# Patient Record
Sex: FEMALE | Race: BLACK OR AFRICAN AMERICAN | NOT HISPANIC OR LATINO | URBAN - METROPOLITAN AREA
[De-identification: names, ages, dates, MRNs, and addresses within clinical notes are randomized per-mention and may not be internally consistent; named-entity substitution may affect disease eponyms.]

---

## 2019-03-25 ENCOUNTER — EMERGENCY (EMERGENCY)
Facility: HOSPITAL | Age: 29
LOS: 1 days | Discharge: HOME | End: 2019-03-25
Attending: EMERGENCY MEDICINE
Payer: MEDICAID

## 2019-03-25 VITALS
DIASTOLIC BLOOD PRESSURE: 72 MMHG | RESPIRATION RATE: 18 BRPM | HEIGHT: 60 IN | TEMPERATURE: 98 F | OXYGEN SATURATION: 100 % | WEIGHT: 169.98 LBS | HEART RATE: 95 BPM | SYSTOLIC BLOOD PRESSURE: 117 MMHG

## 2019-03-25 VITALS
HEART RATE: 72 BPM | DIASTOLIC BLOOD PRESSURE: 74 MMHG | OXYGEN SATURATION: 100 % | TEMPERATURE: 99 F | SYSTOLIC BLOOD PRESSURE: 110 MMHG | RESPIRATION RATE: 18 BRPM

## 2019-03-25 PROCEDURE — 99282 EMERGENCY DEPT VISIT SF MDM: CPT

## 2019-03-25 NOTE — ED PROVIDER NOTE - SKIN, MLM
2 small 1cm non-tender superficial areas of swelling on pt. R breast below nipple, c/w dermatitis. No LAD.

## 2019-03-25 NOTE — ED PROVIDER NOTE - ATTENDING CONTRIBUTION TO CARE
28yo F denies PMHx p/w CC breast lump under r breast, indurated, mild pain, small, non floucence at gland sites, likely mild early abscess, no skin changes, warm compresses, breast ca follow up, doesn't require abx at this time.

## 2019-03-25 NOTE — ED ADULT NURSE NOTE - NSIMPLEMENTINTERV_GEN_ALL_ED
Implemented All Universal Safety Interventions:  [place] to call system. Call bell, personal items and telephone within reach. Instruct patient to call for assistance. Room bathroom lighting operational. Non-slip footwear when patient is off stretcher. Physically safe environment: no spills, clutter or unnecessary equipment. Stretcher in lowest position, wheels locked, appropriate side rails in place.

## 2019-03-25 NOTE — ED PROVIDER NOTE - OBJECTIVE STATEMENT
28yo F denies PMHx p/w CC breast lump. Pt. states she has noticed occasional bumps on her armpits/areas that she shaves for the past few months, over past couple of days she had noticed 2 small painless lumps on her R breast. She states lumps are not painful and do not itch. Denies fever chills cp sob n/v/d.

## 2019-03-25 NOTE — ED ADULT NURSE NOTE - CHPI ED NUR SYMPTOMS NEG
no fever/no dizziness/no weakness/no decreased eating/drinking/no chills/no pain/no tingling/no nausea/no vomiting

## 2019-03-25 NOTE — ED PROVIDER NOTE - NSFOLLOWUPINSTRUCTIONS_ED_ALL_ED_FT
1. Follow up with General Surgery/Breast Clinic  2. Use warm compresses  3. Take tylenol or motrin for any pain or discomfort  4. Return to ED if symptoms worsen or if rash extends or you develop fever chills weakness etc.

## 2019-03-25 NOTE — ED PROVIDER NOTE - NSFOLLOWUPCLINICS_GEN_ALL_ED_FT
[facility]  General Surgery  [address] [place] [address] - 3rd [place] [address]  Phone: [phone]  Fax:   Follow Up Time:

## 2019-03-25 NOTE — ED PROVIDER NOTE - CLINICAL SUMMARY MEDICAL DECISION MAKING FREE TEXT BOX
28yo F denies PMhx p/w CC breast lump for 2 days c/w dermatitis, will reassure and give derm follow up.

## 2019-03-25 NOTE — ED ADULT NURSE NOTE - OBJECTIVE STATEMENT
29 year old A&Ox3 female presents ambulatory to ED complaining of 3 lumps on right breast x 2 weeks. Patient states that her son is sick and she has " been neglecting my own health ever since." Confirms having similar "abscesses" like this in the past, located on arms and groin but not on breast. Denies any drainage, pain, fevers, chills, unintentional weight loss. 2 quarter sized lumps located underneath right nipple, skin intact, slight discoloration around hair follicle, denies pain upon palpation. Denies CP, SOB, n/v/d, abdominal pain, urinary symptoms, HA, blurry vision, numbness, tingling in upper and lower extremities. VSS patient updated on plan of care.

## 2019-04-01 PROBLEM — Z00.00 ENCOUNTER FOR PREVENTIVE HEALTH EXAMINATION: Status: ACTIVE | Noted: 2019-04-01

## 2019-04-22 ENCOUNTER — APPOINTMENT (OUTPATIENT)
Dept: OBGYN | Facility: HOSPITAL | Age: 29
End: 2019-04-22

## 2019-07-08 ENCOUNTER — EMERGENCY (EMERGENCY)
Facility: HOSPITAL | Age: 29
LOS: 1 days | Discharge: HOME | End: 2019-07-08
Attending: EMERGENCY MEDICINE
Payer: MEDICAID

## 2019-07-08 VITALS
RESPIRATION RATE: 16 BRPM | OXYGEN SATURATION: 98 % | SYSTOLIC BLOOD PRESSURE: 121 MMHG | HEART RATE: 70 BPM | DIASTOLIC BLOOD PRESSURE: 81 MMHG

## 2019-07-08 VITALS
DIASTOLIC BLOOD PRESSURE: 82 MMHG | WEIGHT: 169.98 LBS | SYSTOLIC BLOOD PRESSURE: 126 MMHG | OXYGEN SATURATION: 98 % | HEART RATE: 74 BPM | HEIGHT: 60 IN | RESPIRATION RATE: 18 BRPM | TEMPERATURE: 98 F

## 2019-07-08 PROCEDURE — 99283 EMERGENCY DEPT VISIT LOW MDM: CPT

## 2019-07-08 PROCEDURE — 73630 X-RAY EXAM OF FOOT: CPT | Mod: 26,RT

## 2019-07-08 PROCEDURE — 73630 X-RAY EXAM OF FOOT: CPT

## 2019-07-08 RX ORDER — ACETAMINOPHEN 500 MG
650 TABLET ORAL ONCE
Refills: 0 | Status: COMPLETED | OUTPATIENT
Start: 2019-07-08 | End: 2019-07-08

## 2019-07-08 RX ORDER — IBUPROFEN 200 MG
600 TABLET ORAL ONCE
Refills: 0 | Status: COMPLETED | OUTPATIENT
Start: 2019-07-08 | End: 2019-07-08

## 2019-07-08 RX ADMIN — Medication 600 MILLIGRAM(S): at 11:49

## 2019-07-08 RX ADMIN — Medication 650 MILLIGRAM(S): at 11:50

## 2019-07-08 NOTE — ED PROVIDER NOTE - NS ED ROS FT
GENERAL: No fever or chills, EYES: no change in vision, HEENT: no trouble speaking, CARDIAC: no chest pain, palpitation PULMONARY: no cough or SOB,   SKIN: no rashes, NEURO: no headache,  MSK: + foot pain and swelling  ~Nancie Quispe MD (PGY 1)

## 2019-07-08 NOTE — ED PROVIDER NOTE - OBJECTIVE STATEMENT
Nancie Quispe MD: 30 yo F no PMH p/w right foot swelling and pain started yesterday. Pt states that she always had swelling Nancie Quispe MD: 28 yo F no PMH p/w right foot swelling and pain started yesterday. Pt states that the pain is on the dorsal and rivers medial side of the foot. Pt states that she always had some swelling in that foot after injury to the foot 15 ys ago which showed no fx. However, pt state that her foot has never swelled this much and never had pain associated with it. Aggravated by weight bearing and temporarily relieve with ice. Pt denies any recent injury or prolong standing. Pt also denies any hx of blood clots, fever, weakness, numbness.

## 2019-07-08 NOTE — ED PROVIDER NOTE - NSFOLLOWUPINSTRUCTIONS_ED_ALL_ED_FT
1) Please follow-up with your primary care doctor in the next 2-3 days.  Please call tomorrow for an appointment.  If you cannot follow-up with your primary care doctor please return to the ED for any urgent issues. Follow up with a Orthopedic doctor in 2-3 days  2) You were given a copy of the tests performed today.  Please bring the results with you and review them with your primary care doctor.  3) If you have any worsening of symptoms or any other concerns please return to the ED immediately. Such as but not limited to fever, increase pain, weakness and numbness  4) Please continue taking your home medications as directed. Apply ice 20 minutes on then 20 minutes off as much as you can tolerate in the next 24 hours.  Take motrin 600 mg every 6 hours for the next 2 days then every 6 hours as needed Keep your leg elevated as much as possible.

## 2019-07-08 NOTE — ED PROVIDER NOTE - NSFOLLOWUPCLINICS_GEN_ALL_ED_FT
[facility]  Orthopedic Surgery  [address], 3rd & 4th [place] [address]  Phone: [phone]  Fax:   Follow Up Time:

## 2019-07-08 NOTE — ED PROVIDER NOTE - ATTENDING CONTRIBUTION TO CARE
28 yo F no PMH p/w right foot swelling and pain which began yesterday. Pt indicates pain is on the dorsal aspects of the medial right foot. Pt states that she always had some swelling in that foot after injury to the foot 15 ys ago, which showed no fx.  However, pt state that her foot has never swelled this much and never had pain associated with it. Aggravated by weight bearing and temporarily relieve with ice. Pt denies any recent injury or prolong standing. Pt also denies any hx of blood clots, fever, weakness, numbness.  No recent travel, no calf pain or swelling, no redness, no fever.    On Physical Exam:  General: well appearing, in NAD, speaking clearly in full sentences and without difficulty; cooperative with exam  HEENT: airway patent  Skin: intact, no rash  Extremities: mild nonpitting dorsal mid right foot edema, tenderness along this aspect (diffuse along mid foot and ankle, no deformity, able to fully range foot and ankle; dp and pt pulses present; no RLE calf tenderness; no calf or pre tibial swelling/edema; right foot has no overlying rash or erythema and no palpable fluctuance or crepitance.  Neuro: no gross neurologic deficits; distal sensation in RLE intact    AP: chronic swelling, likely secondary to old muscular /ligamentous injury; will check x-ray, give NSAIDs, and reassess; if no acute injuries on xray / no fractures, as has been weight bearing all ready, will be stable for continued evaluation/management as outpatient.  Swelling is limited to dorsum of foot, no significant ankle or proximal swelling, and has no apparent risk factors for DVT, no w/u for DVT indicated.  No evidence elicited from history or noted on exam to suggest acute infectious etiology.

## 2019-07-08 NOTE — ED ADULT NURSE NOTE - OBJECTIVE STATEMENT
pt 28 yo female with nontraumatic swelling tenderness to right foot pt states no known injury swelling right great toe area with mild warmth pt afebrile skin warm dry no med hx pt is smoker hx c sections pt ucg neg exam by md with meds given and xray posted  and pending

## 2019-07-08 NOTE — ED ADULT NURSE NOTE - ED STAT RN HANDOFF DETAILS
Pt received from SABIHA Jo in blue, alert and oriented times three, breathing spontaneous, unlabored without distress on room air, NAD, awaits x-ray results

## 2019-07-08 NOTE — ED PROVIDER NOTE - CLINICAL SUMMARY MEDICAL DECISION MAKING FREE TEXT BOX
Nancie Quispe MD: 28 yo F no PMH p/w right foot swelling and pain started yesterday. Could bone spur from old injury vs gout, will get xray of foot to r/o fx or spur and pain control with tylenol or motrin reassess

## 2020-09-12 NOTE — ED ADULT NURSE NOTE - RESPIRATORY WDL
normal Breathing spontaneous and unlabored. Breath sounds clear and equal bilaterally with regular rhythm.

## 2020-11-13 ENCOUNTER — APPOINTMENT (OUTPATIENT)
Dept: FAMILY MEDICINE | Facility: CLINIC | Age: 30
End: 2020-11-13

## 2021-06-08 ENCOUNTER — EMERGENCY (EMERGENCY)
Facility: HOSPITAL | Age: 31
LOS: 1 days | Discharge: HOME | End: 2021-06-08
Attending: STUDENT IN AN ORGANIZED HEALTH CARE EDUCATION/TRAINING PROGRAM
Payer: MEDICAID

## 2021-06-08 VITALS
TEMPERATURE: 100 F | OXYGEN SATURATION: 98 % | DIASTOLIC BLOOD PRESSURE: 63 MMHG | SYSTOLIC BLOOD PRESSURE: 116 MMHG | RESPIRATION RATE: 20 BRPM

## 2021-06-08 VITALS
TEMPERATURE: 103 F | SYSTOLIC BLOOD PRESSURE: 116 MMHG | WEIGHT: 169.98 LBS | HEIGHT: 60 IN | OXYGEN SATURATION: 99 % | HEART RATE: 104 BPM | RESPIRATION RATE: 16 BRPM | DIASTOLIC BLOOD PRESSURE: 75 MMHG

## 2021-06-08 LAB
ALBUMIN SERPL ELPH-MCNC: 4.3 G/DL — SIGNIFICANT CHANGE UP (ref 3.3–5)
ALP SERPL-CCNC: 54 U/L — SIGNIFICANT CHANGE UP (ref 40–120)
ALT FLD-CCNC: 20 U/L — SIGNIFICANT CHANGE UP (ref 10–45)
ANION GAP SERPL CALC-SCNC: 13 MMOL/L — SIGNIFICANT CHANGE UP (ref 5–17)
APPEARANCE UR: CLEAR — SIGNIFICANT CHANGE UP
AST SERPL-CCNC: 15 U/L — SIGNIFICANT CHANGE UP (ref 10–40)
BACTERIA # UR AUTO: NEGATIVE — SIGNIFICANT CHANGE UP
BASE EXCESS BLDV CALC-SCNC: 1.2 MMOL/L — SIGNIFICANT CHANGE UP (ref -2–2)
BASOPHILS # BLD AUTO: 0 K/UL — SIGNIFICANT CHANGE UP (ref 0–0.2)
BASOPHILS NFR BLD AUTO: 0 % — SIGNIFICANT CHANGE UP (ref 0–2)
BILIRUB SERPL-MCNC: 0.7 MG/DL — SIGNIFICANT CHANGE UP (ref 0.2–1.2)
BILIRUB UR-MCNC: NEGATIVE — SIGNIFICANT CHANGE UP
BUN SERPL-MCNC: 5 MG/DL — LOW (ref 7–23)
CALCIUM SERPL-MCNC: 9.3 MG/DL — SIGNIFICANT CHANGE UP (ref 8.4–10.5)
CHLORIDE SERPL-SCNC: 99 MMOL/L — SIGNIFICANT CHANGE UP (ref 96–108)
CO2 BLDV-SCNC: 26 MMOL/L — SIGNIFICANT CHANGE UP (ref 22–30)
CO2 SERPL-SCNC: 21 MMOL/L — LOW (ref 22–31)
COLOR SPEC: YELLOW — SIGNIFICANT CHANGE UP
CREAT SERPL-MCNC: 0.75 MG/DL — SIGNIFICANT CHANGE UP (ref 0.5–1.3)
CRP SERPL-MCNC: 153 MG/L — HIGH (ref 0–4)
DIFF PNL FLD: ABNORMAL
EOSINOPHIL # BLD AUTO: 0 K/UL — SIGNIFICANT CHANGE UP (ref 0–0.5)
EOSINOPHIL NFR BLD AUTO: 0 % — SIGNIFICANT CHANGE UP (ref 0–6)
EPI CELLS # UR: 1 /HPF — SIGNIFICANT CHANGE UP
GAS PNL BLDV: SIGNIFICANT CHANGE UP
GLUCOSE SERPL-MCNC: 92 MG/DL — SIGNIFICANT CHANGE UP (ref 70–99)
GLUCOSE UR QL: NEGATIVE — SIGNIFICANT CHANGE UP
HCO3 BLDV-SCNC: 25 MMOL/L — SIGNIFICANT CHANGE UP (ref 21–29)
HCT VFR BLD CALC: 39.4 % — SIGNIFICANT CHANGE UP (ref 34.5–45)
HGB BLD-MCNC: 13 G/DL — SIGNIFICANT CHANGE UP (ref 11.5–15.5)
HYALINE CASTS # UR AUTO: 1 /LPF — SIGNIFICANT CHANGE UP (ref 0–2)
KETONES UR-MCNC: NEGATIVE — SIGNIFICANT CHANGE UP
LEUKOCYTE ESTERASE UR-ACNC: NEGATIVE — SIGNIFICANT CHANGE UP
LG PLATELETS BLD QL AUTO: SLIGHT — SIGNIFICANT CHANGE UP
LYMPHOCYTES # BLD AUTO: 16 % — SIGNIFICANT CHANGE UP (ref 13–44)
LYMPHOCYTES # BLD AUTO: 2.17 K/UL — SIGNIFICANT CHANGE UP (ref 1–3.3)
MANUAL SMEAR VERIFICATION: SIGNIFICANT CHANGE UP
MCHC RBC-ENTMCNC: 28.3 PG — SIGNIFICANT CHANGE UP (ref 27–34)
MCHC RBC-ENTMCNC: 33 GM/DL — SIGNIFICANT CHANGE UP (ref 32–36)
MCV RBC AUTO: 85.7 FL — SIGNIFICANT CHANGE UP (ref 80–100)
MONOCYTES # BLD AUTO: 1.22 K/UL — HIGH (ref 0–0.9)
MONOCYTES NFR BLD AUTO: 9 % — SIGNIFICANT CHANGE UP (ref 2–14)
NEUTROPHILS # BLD AUTO: 10.16 K/UL — HIGH (ref 1.8–7.4)
NEUTROPHILS NFR BLD AUTO: 75 % — SIGNIFICANT CHANGE UP (ref 43–77)
NITRITE UR-MCNC: NEGATIVE — SIGNIFICANT CHANGE UP
NRBC # BLD: 0 /100 — SIGNIFICANT CHANGE UP (ref 0–0)
PCO2 BLDV: 38 MMHG — SIGNIFICANT CHANGE UP (ref 35–50)
PH BLDV: 7.43 — SIGNIFICANT CHANGE UP (ref 7.35–7.45)
PH UR: 6 — SIGNIFICANT CHANGE UP (ref 5–8)
PLAT MORPH BLD: NORMAL — SIGNIFICANT CHANGE UP
PLATELET # BLD AUTO: 217 K/UL — SIGNIFICANT CHANGE UP (ref 150–400)
PO2 BLDV: 31 MMHG — SIGNIFICANT CHANGE UP (ref 25–45)
POTASSIUM SERPL-MCNC: 3.7 MMOL/L — SIGNIFICANT CHANGE UP (ref 3.5–5.3)
POTASSIUM SERPL-SCNC: 3.7 MMOL/L — SIGNIFICANT CHANGE UP (ref 3.5–5.3)
PROCALCITONIN SERPL-MCNC: 0.05 NG/ML — SIGNIFICANT CHANGE UP (ref 0.02–0.1)
PROT SERPL-MCNC: 8 G/DL — SIGNIFICANT CHANGE UP (ref 6–8.3)
PROT UR-MCNC: NEGATIVE — SIGNIFICANT CHANGE UP
RBC # BLD: 4.6 M/UL — SIGNIFICANT CHANGE UP (ref 3.8–5.2)
RBC # FLD: 13.3 % — SIGNIFICANT CHANGE UP (ref 10.3–14.5)
RBC BLD AUTO: SIGNIFICANT CHANGE UP
RBC CASTS # UR COMP ASSIST: 1 /HPF — SIGNIFICANT CHANGE UP (ref 0–4)
SAO2 % BLDV: 64 % — LOW (ref 67–88)
SARS-COV-2 RNA SPEC QL NAA+PROBE: SIGNIFICANT CHANGE UP
SODIUM SERPL-SCNC: 133 MMOL/L — LOW (ref 135–145)
SP GR SPEC: 1.01 — SIGNIFICANT CHANGE UP (ref 1.01–1.02)
UROBILINOGEN FLD QL: NEGATIVE — SIGNIFICANT CHANGE UP
WBC # BLD: 13.54 K/UL — HIGH (ref 3.8–10.5)
WBC # FLD AUTO: 13.54 K/UL — HIGH (ref 3.8–10.5)
WBC UR QL: 1 /HPF — SIGNIFICANT CHANGE UP (ref 0–5)

## 2021-06-08 PROCEDURE — 71045 X-RAY EXAM CHEST 1 VIEW: CPT | Mod: 26

## 2021-06-08 PROCEDURE — 93010 ELECTROCARDIOGRAM REPORT: CPT

## 2021-06-08 PROCEDURE — 82803 BLOOD GASES ANY COMBINATION: CPT

## 2021-06-08 PROCEDURE — 81001 URINALYSIS AUTO W/SCOPE: CPT

## 2021-06-08 PROCEDURE — 99285 EMERGENCY DEPT VISIT HI MDM: CPT | Mod: 25

## 2021-06-08 PROCEDURE — 93005 ELECTROCARDIOGRAM TRACING: CPT

## 2021-06-08 PROCEDURE — U0005: CPT

## 2021-06-08 PROCEDURE — 82728 ASSAY OF FERRITIN: CPT

## 2021-06-08 PROCEDURE — 71045 X-RAY EXAM CHEST 1 VIEW: CPT

## 2021-06-08 PROCEDURE — 86140 C-REACTIVE PROTEIN: CPT

## 2021-06-08 PROCEDURE — 99285 EMERGENCY DEPT VISIT HI MDM: CPT

## 2021-06-08 PROCEDURE — 87086 URINE CULTURE/COLONY COUNT: CPT

## 2021-06-08 PROCEDURE — 85025 COMPLETE CBC W/AUTO DIFF WBC: CPT

## 2021-06-08 PROCEDURE — U0003: CPT

## 2021-06-08 PROCEDURE — 84145 PROCALCITONIN (PCT): CPT

## 2021-06-08 PROCEDURE — 80053 COMPREHEN METABOLIC PANEL: CPT

## 2021-06-08 RX ORDER — CIPROFLOXACIN LACTATE 400MG/40ML
1 VIAL (ML) INTRAVENOUS
Qty: 9 | Refills: 0
Start: 2021-06-08 | End: 2021-06-16

## 2021-06-08 RX ORDER — ACETAMINOPHEN 500 MG
975 TABLET ORAL ONCE
Refills: 0 | Status: COMPLETED | OUTPATIENT
Start: 2021-06-08 | End: 2021-06-08

## 2021-06-08 RX ORDER — SODIUM CHLORIDE 9 MG/ML
1000 INJECTION INTRAMUSCULAR; INTRAVENOUS; SUBCUTANEOUS ONCE
Refills: 0 | Status: COMPLETED | OUTPATIENT
Start: 2021-06-08 | End: 2021-06-08

## 2021-06-08 RX ORDER — IBUPROFEN 200 MG
600 TABLET ORAL ONCE
Refills: 0 | Status: COMPLETED | OUTPATIENT
Start: 2021-06-08 | End: 2021-06-08

## 2021-06-08 RX ADMIN — SODIUM CHLORIDE 1000 MILLILITER(S): 9 INJECTION INTRAMUSCULAR; INTRAVENOUS; SUBCUTANEOUS at 12:56

## 2021-06-08 RX ADMIN — Medication 600 MILLIGRAM(S): at 14:42

## 2021-06-08 RX ADMIN — Medication 975 MILLIGRAM(S): at 12:55

## 2021-06-08 NOTE — ED PROVIDER NOTE - SEVERE SEPSIS ALERT DETAILS
I have seen and evaluated this patient and do not believe the patient is septic at this time.  I will continue to evaluate. -Vineet Mccarthy PA-C

## 2021-06-08 NOTE — ED ADULT NURSE NOTE - CHPI ED NUR SYMPTOMS NEG
no abdominal pain/no decreased eating/drinking/no diarrhea/no headache/no rash/no shortness of breath/no vomiting

## 2021-06-08 NOTE — ED PROVIDER NOTE - NS ED ROS FT
Review of Systems:  · Constitutional: fatigue, chills, fever, night sweats, no weight loss  · Nose: no epistaxis  · Mouth/Throat: no difficulty in swallowing, trachea midline, uvula midline  . Cardio: No CP, no palpitations, no chest pressure, no ripping chest pain  · Respiratory: no cough, no exertional dyspnea, no hemoptysis, no orthopnea, no shortness of breath  · Gastrointestinal: no abdominal pain, no diarrhea, no melena, no nausea, no vomiting  · Genitourinary: no difficulty urinating, no dysuria, no hematuria  · MUSCULOSKELETAL: FROM of all extremities  · Skin: no abrasion; no bruising; no laceration  · Neurological: no change in level of consciousness, no headache, no seizures  · ROS STATEMENT: all other ROS negative except as per HPI

## 2021-06-08 NOTE — ED PROVIDER NOTE - PATIENT PORTAL LINK FT
You can access the FollowMyHealth Patient Portal offered by [facility] by registering at the following website: [email or web address]. By joining [employer]’s FollowMyHealth portal, you will also be able to view your health information using other applications (apps) compatible with our system.

## 2021-06-08 NOTE — ED PROVIDER NOTE - PROGRESS NOTE DETAILS
Not concerned of sepsis att. more likely bacterial pneumonia. tachycardia 2/2 to dehydration. plan to abx and dc. discussed with attending aggred Vineet Mccarthy PA-C Jerod PARDO: pt comfortable w plan for dc, instructed to follow up w pmd for crp, strict return precautions discussed. no hypoxia.

## 2021-06-08 NOTE — ED PROVIDER NOTE - NSFOLLOWUPINSTRUCTIONS_ED_ALL_ED_FT
There are no signs of emergency conditions requiring admission to the hospital on today's workup.  Based on the evaluation, a presumptive diagnosis was made, however, further evaluation may be required by your primary care physician or a specialist for a more definitive diagnosis.  Therefore, please follow-up as directed or return to the Emergency Department if your symptoms change or worsen.    We recommend that you:  1. See your primary care physician within the next 72 hours for follow up.  Bring a copy of your discharge paperwork (including any test results) to your doctor.  2. Please take levofloxacin 750 mg 1 tablet once a day for 10 days.   3. Please take 500mg of Tylenol every 6-8 hours as needed for fevers.   4. Please stay hydrated and maintain healthy diet.       *** Return immediately if you have worsening symptoms, chest pain, Shortness of Breath, abdominal pain, Nausea/Vomiting/Diarrhea, dizziness, weakness, confusion, vision changes, urinary symptoms, syncope, falls, trauma, discharge, bleeding, fevers, or any other new/concerning symptoms. *** There are no signs of emergency conditions requiring admission to the hospital on today's workup.  Based on the evaluation, a presumptive diagnosis was made, however, further evaluation may be required by your primary care physician or a specialist for a more definitive diagnosis.  Therefore, please follow-up as directed or return to the Emergency Department if your symptoms change or worsen.    We recommend that you:  1. See your primary care physician within the next 72 hours for follow up.  Bring a copy of your discharge paperwork (including any test results) to your doctor. Your blood work today had an abnormal levels and need to be evaluated by your PCP.   2. Please take levofloxacin 750 mg 1 tablet once a day for 10 days. Your first dose was given in the ER today, take the next dose tomorrow.  3. Please take 500mg of Tylenol every 6-8 hours as needed for fevers.   4. Please stay hydrated and maintain healthy diet. 5.      *** Return immediately if you have worsening symptoms, chest pain, Shortness of Breath, abdominal pain, Nausea/Vomiting/Diarrhea, dizziness, weakness, confusion, vision changes, urinary symptoms, syncope, falls, trauma, discharge, bleeding, fevers, or any other new/concerning symptoms. ***

## 2021-06-08 NOTE — ED PROVIDER NOTE - OBJECTIVE STATEMENT
31 year old fm with no pmhx presents to the ED with 3 days of chills, fever, fatigue, and night sweats. patient reports Sunday night patient was awoken at night 31 year old fm with no pmhx presents to the ED with 3 days of chills, fever, fatigue, and night sweats. patient reports Sunday night patient was awoken at night with sweats, fever, 31 year old fm with no pmhx presents to the ED with 3 days of chills, fever, fatigue, and night sweats. patient reports Sunday night patient was awoken at night with sweats. patient reports within the next 2 days patient has become more fatigued and high fevers. patient reports she has taken Tylenol 1x yesterday with some relief. patient denies chest pain, Shortness of Breath, abdominal pain, Nausea/Vomiting/Diarrhea, dizziness, weakness, confusion, vision changes, urinary symptoms, syncope, falls, trauma, discharge, bleeding. patient reports she is able to tolerate PO without issue. patient reports no hx of covid in the past and denies vaccination.

## 2021-06-08 NOTE — ED PROVIDER NOTE - ATTENDING CONTRIBUTION TO CARE
I have personally performed a face to face medical and diagnostic evaluation of the patient. I have discussed with and reviewed the ACP's note and agree with the History, ROS, Physical Exam and MDM unless otherwise indicated. A brief summary of my personal evaluation and impression can be found below.    31F hx of Hidradenitis on Humera presents with a cc of fever chills since Sunday no sick contacts a/w cough, not vaccinated against covid, otherwise no focal complaints no urinary sx bowel or bladder concerns and no rashes. Took APAP overnight, however had fever again this morning prompting visit. Currently on menstrual cycle. No new LE swelling.  Denies n/v/cp/sob. Denies headache, syncope, lightheadedness, dizziness. Denies chest palpitations, abdominal pain. Denies edema. Denies dysuria, hematuria, BRBPR, tarry stools, diarrhea, constipation.     All other ROS negative, except as above and as per HPI and ROS section.    VITALS: Initial triage and subsequent vitals have been reviewed by me.  GEN APPEARANCE: WDWN, alert, non-toxic, NAD  HEAD: Atraumatic.  EYES: PERRLa, EOMI, vision grossly intact.   NECK: Supple  CV: RRR, S1S2, no c/r/m/g. Cap refill <2 seconds. No bruits.   LUNGS: CTAB. No abnormal breath sounds.  ABDOMEN: Soft, NTND. No guarding or rebound.   MSK/EXT: No spinal or extremity point tenderness. No CVA ttp. Pelvis stable. No peripheral edema.  NEURO: Alert, follows commands. Weight bearing normal. Speech normal. Sensation and motor normal x4 extremities.   SKIN: Warm, dry and intact. No rash.  PSYCH: Appropriate    Plan/MDM: 31F hx of Hidradenitis on Humera presents with a cc of fever chills since Sunday no sick contacts a/w cough, not vaccinated against covid, otherwise no focal complaints no urinary sx bowel or bladder concerns and no rashes. exam noted fever and mild tachycardia, not hypoxic, otherwise vss well appearing non toxic and non focal exam ddx c/f fever, unclear source at this time consider covid vs pna vs other uri, also consider UTI low c/f intraabdominal surgical pathology as abdomen ntnd, low c/f bacteremia as pt well appearing,  pt is on immunomodulator though appears very well, plan to check labs cxr ua give meds ivf reassess thereafter.

## 2021-06-08 NOTE — ED ADULT NURSE NOTE - OBJECTIVE STATEMENT
pt 30 yo female presents to er with fever x 2 days no travel no sick contacts states small cough pt is a smoker not vaccinated no covid hx placed on isolation room pt denies any abd pain or dysuria took no tylenol today prior to arrival pt alert oriented bilateral clear breath sounds ascultated skin warm dry abd soft positive bowel sounds pt states appetite normal denies any headache pt swabbed for covid with tylenol and iv fluids in progress

## 2021-06-09 LAB — FERRITIN SERPL-MCNC: 139 NG/ML — SIGNIFICANT CHANGE UP (ref 15–150)

## 2021-06-10 LAB
CULTURE RESULTS: SIGNIFICANT CHANGE UP
SPECIMEN SOURCE: SIGNIFICANT CHANGE UP

## 2021-09-11 ENCOUNTER — APPOINTMENT (OUTPATIENT)
Dept: PEDIATRICS | Facility: CLINIC | Age: 31
End: 2021-09-11

## 2021-09-11 ENCOUNTER — NON-APPOINTMENT (OUTPATIENT)
Age: 31
End: 2021-09-11

## 2021-09-11 DIAGNOSIS — Z23 ENCOUNTER FOR IMMUNIZATION: ICD-10-CM

## 2021-10-02 ENCOUNTER — APPOINTMENT (OUTPATIENT)
Dept: PEDIATRICS | Facility: CLINIC | Age: 31
End: 2021-10-02

## 2022-07-16 ENCOUNTER — EMERGENCY (EMERGENCY)
Facility: HOSPITAL | Age: 32
LOS: 1 days | Discharge: HOME | End: 2022-07-16
Attending: EMERGENCY MEDICINE
Payer: MEDICAID

## 2022-07-16 VITALS
HEART RATE: 78 BPM | SYSTOLIC BLOOD PRESSURE: 129 MMHG | TEMPERATURE: 98 F | DIASTOLIC BLOOD PRESSURE: 85 MMHG | OXYGEN SATURATION: 100 % | RESPIRATION RATE: 16 BRPM | WEIGHT: 160.06 LBS | HEIGHT: 60 IN

## 2022-07-16 PROCEDURE — 93970 EXTREMITY STUDY: CPT | Mod: 26

## 2022-07-16 PROCEDURE — 99284 EMERGENCY DEPT VISIT MOD MDM: CPT | Mod: 25

## 2022-07-16 PROCEDURE — 93970 EXTREMITY STUDY: CPT

## 2022-07-16 PROCEDURE — 99284 EMERGENCY DEPT VISIT MOD MDM: CPT

## 2022-07-16 NOTE — ED PROVIDER NOTE - ATTENDING APP SHARED VISIT CONTRIBUTION OF CARE
Bilateral leg swelling R>L persisting since flight to [place], family history of blood clots - plan for US to r/o DVT.

## 2022-07-16 NOTE — ED ADULT NURSE NOTE - OBJECTIVE STATEMENT
Patient came in c/o bilateral leg swelling. Pt came from [place]. Pt states that she was very mobile and active in [place] visiting [place]. No distress. Breathing easy and non labored. Pt states that her mother and brother has hx: blood clots. No dyspnea on exertion noted. Afebrile. No chills no diaphoresis.

## 2022-07-16 NOTE — ED PROVIDER NOTE - NS ED ATTENDING STATEMENT MOD
This was a shared visit with the ANEGLINA. I reviewed and verified the documentation and independently performed the documented:

## 2022-07-16 NOTE — ED PROVIDER NOTE - OBJECTIVE STATEMENT
33 y/o female PMHx current 1ppd x15 year smoker returned from [place] on 7/8/22 now presenting to the ED with b/l lower extremity swelling R>L over last few days. Patient reported she was very mobile while in [place] visiting [place] and noticed the swelling progressing. Patient reported mother and brother had history blood clots and unsure exactly why. Denied current CP, SOB abdominal pain, N/V/D, palpitations, syncope, previous h/o DVT PE, hormone use

## 2022-07-16 NOTE — ED PROVIDER NOTE - NSFOLLOWUPINSTRUCTIONS_ED_ALL_ED_FT
Follow up with your Primary Care Physician within the next 2-3 days  Bring a copy of your test results with you to your appointment  Continue your current medication regimen  Return to the Emergency Room if you experience new or worsening symptoms abdominal pain, nausea, vomiting, fever chills, cough, chest pain, shortness of breath, dizziness, slurred speech, weakness, gait abnormality   SMOKING CESSATION !

## 2022-07-16 NOTE — ED PROVIDER NOTE - PATIENT PORTAL LINK FT
You can access the FollowMyHealth Patient Portal offered by [facility] by registering at the following website: [email or web address]. By joining [email or web address]’s [email or web address] portal, you will also be able to view your health information using other applications (apps) compatible with our system.

## 2022-11-15 PROBLEM — F17.200 NICOTINE DEPENDENCE, UNSPECIFIED, UNCOMPLICATED: Chronic | Status: ACTIVE | Noted: 2022-07-16

## 2022-12-01 ENCOUNTER — RESULT REVIEW (OUTPATIENT)
Age: 32
End: 2022-12-01

## 2022-12-01 ENCOUNTER — OUTPATIENT (OUTPATIENT)
Dept: OUTPATIENT SERVICES | Facility: HOSPITAL | Age: 32
LOS: 1 days | End: 2022-12-01

## 2022-12-01 ENCOUNTER — APPOINTMENT (OUTPATIENT)
Dept: OBGYN | Facility: HOSPITAL | Age: 32
End: 2022-12-01
Payer: MEDICAID

## 2022-12-01 VITALS
HEIGHT: 60 IN | BODY MASS INDEX: 36.52 KG/M2 | HEART RATE: 78 BPM | WEIGHT: 186 LBS | TEMPERATURE: 97.6 F | SYSTOLIC BLOOD PRESSURE: 115 MMHG | DIASTOLIC BLOOD PRESSURE: 75 MMHG

## 2022-12-01 DIAGNOSIS — Z82.49 FAMILY HISTORY OF ISCHEMIC HEART DISEASE AND OTHER DISEASES OF THE CIRCULATORY SYSTEM: ICD-10-CM

## 2022-12-01 DIAGNOSIS — N64.4 MASTODYNIA: ICD-10-CM

## 2022-12-01 DIAGNOSIS — Z83.3 FAMILY HISTORY OF DIABETES MELLITUS: ICD-10-CM

## 2022-12-01 DIAGNOSIS — F17.210 NICOTINE DEPENDENCE, CIGARETTES, UNCOMPLICATED: ICD-10-CM

## 2022-12-01 LAB — HIV 1+2 AB+HIV1 P24 AG SERPL QL IA: SIGNIFICANT CHANGE UP

## 2022-12-01 PROCEDURE — 99385 PREV VISIT NEW AGE 18-39: CPT | Mod: GC

## 2022-12-01 PROCEDURE — 99213 OFFICE O/P EST LOW 20 MIN: CPT | Mod: GC,25

## 2022-12-02 DIAGNOSIS — Z01.419 ENCOUNTER FOR GYNECOLOGICAL EXAMINATION (GENERAL) (ROUTINE) WITHOUT ABNORMAL FINDINGS: ICD-10-CM

## 2022-12-02 DIAGNOSIS — N64.4 MASTODYNIA: ICD-10-CM

## 2022-12-02 LAB
HBV SURFACE AG SER-ACNC: SIGNIFICANT CHANGE UP
HCV RNA SPEC NAA+PROBE-LOG IU: SIGNIFICANT CHANGE UP
HCV RNA SPEC NAA+PROBE-LOG IU: SIGNIFICANT CHANGE UP LOGIU/ML
HPV HIGH+LOW RISK DNA PNL CVX: SIGNIFICANT CHANGE UP

## 2022-12-03 LAB
C TRACH RRNA SPEC QL NAA+PROBE: SIGNIFICANT CHANGE UP
N GONORRHOEA RRNA SPEC QL NAA+PROBE: SIGNIFICANT CHANGE UP
SPECIMEN SOURCE: SIGNIFICANT CHANGE UP

## 2022-12-06 LAB — RPR SER-TITR: SIGNIFICANT CHANGE UP

## 2022-12-10 LAB — CYTOLOGY SPEC DOC CYTO: SIGNIFICANT CHANGE UP

## 2022-12-26 NOTE — HISTORY OF PRESENT ILLNESS
[Regular Cycle Intervals] : periods have been regular [Previously active] : previously active [Men] : men [No] : No [Patient would like to be screened for STIs] : Patient would like to be screened for STIs [FreeTextEntry1] : 11/17/2022

## 2022-12-26 NOTE — PLAN
[FreeTextEntry1] : 31 y/o  LMP ~ presenting to clinic for annual exam.\par \par #HCM\par - Pap smear, G/C, STI testing performed today\par - RTC in 1 yr for annual\par \par #Breast Pain\par - Counseled on decreasing caffeine intake to decrease breast tenderness, and additional consumption of Vitamin E 400mg daily \par - Breast ultrasound\par - RTC in 4 mos after breast ultrasound and recommendations as above\par \par Patient seen, discussed with, and examined by attending Dr Ruiz\par Xochilt Alex\par PGY2

## 2022-12-26 NOTE — PHYSICAL EXAM
[Chaperone Present] : A chaperone was present in the examining room during all aspects of the physical examination [Appropriately responsive] : appropriately responsive [Alert] : alert [No Acute Distress] : no acute distress [Examination Of The Breasts] : a normal appearance [No Masses] : no breast masses were palpable [Labia Majora] : normal [Labia Minora] : normal [Normal] : normal [Uterine Adnexae] : normal [FreeTextEntry6] : no masses noted on exam, old boil scars present [FreeTextEntry4] : scant white discharge

## 2022-12-26 NOTE — REVIEW OF SYSTEMS
[Breast Pain] : breast pain [Fever] : no fever [Chills] : no chills [Abdominal Pain] : no abdominal pain [Urgency] : no urgency [Frequency] : no frequency [Dysuria] : no dysuria [Abn Vaginal bleeding] : no abnormal vaginal bleeding [Pelvic pain] : no pelvic pain [Genital Rash/Irritation] : no genital rash/irritation [Breast Lump] : no breast lump [Nipple Changes] : no nipple changes [Nipple Discharge] : no nipple discharge [Skin Lesion on Breast] : no skin lesion on breast

## 2023-01-31 ENCOUNTER — OUTPATIENT (OUTPATIENT)
Dept: OUTPATIENT SERVICES | Facility: HOSPITAL | Age: 33
LOS: 1 days | End: 2023-01-31
Payer: MEDICAID

## 2023-01-31 ENCOUNTER — RESULT REVIEW (OUTPATIENT)
Age: 33
End: 2023-01-31

## 2023-01-31 ENCOUNTER — APPOINTMENT (OUTPATIENT)
Dept: ULTRASOUND IMAGING | Facility: CLINIC | Age: 33
End: 2023-01-31
Payer: MEDICAID

## 2023-01-31 DIAGNOSIS — N64.4 MASTODYNIA: ICD-10-CM

## 2023-01-31 PROCEDURE — 76641 ULTRASOUND BREAST COMPLETE: CPT | Mod: 26,50

## 2023-01-31 PROCEDURE — 76641 ULTRASOUND BREAST COMPLETE: CPT

## 2023-04-04 ENCOUNTER — APPOINTMENT (OUTPATIENT)
Dept: OBGYN | Facility: HOSPITAL | Age: 33
End: 2023-04-04

## 2023-04-09 ENCOUNTER — EMERGENCY (EMERGENCY)
Facility: HOSPITAL | Age: 33
LOS: 1 days | Discharge: HOME | End: 2023-04-09
Attending: STUDENT IN AN ORGANIZED HEALTH CARE EDUCATION/TRAINING PROGRAM
Payer: MEDICAID

## 2023-04-09 VITALS
TEMPERATURE: 98 F | DIASTOLIC BLOOD PRESSURE: 88 MMHG | SYSTOLIC BLOOD PRESSURE: 138 MMHG | HEART RATE: 88 BPM | RESPIRATION RATE: 18 BRPM | OXYGEN SATURATION: 99 %

## 2023-04-09 VITALS
HEIGHT: 68 IN | HEART RATE: 81 BPM | OXYGEN SATURATION: 99 % | RESPIRATION RATE: 20 BRPM | WEIGHT: 175.05 LBS | DIASTOLIC BLOOD PRESSURE: 85 MMHG | SYSTOLIC BLOOD PRESSURE: 125 MMHG | TEMPERATURE: 98 F

## 2023-04-09 PROCEDURE — 99284 EMERGENCY DEPT VISIT MOD MDM: CPT

## 2023-04-09 PROCEDURE — 99283 EMERGENCY DEPT VISIT LOW MDM: CPT

## 2023-04-09 RX ORDER — CHLORHEXIDINE GLUCONATE 213 G/1000ML
15 SOLUTION TOPICAL
Qty: 2 | Refills: 0
Start: 2023-04-09 | End: 2023-04-23

## 2023-04-09 RX ORDER — ACETAMINOPHEN 500 MG
975 TABLET ORAL ONCE
Refills: 0 | Status: COMPLETED | OUTPATIENT
Start: 2023-04-09 | End: 2023-04-09

## 2023-04-09 RX ORDER — IBUPROFEN 200 MG
600 TABLET ORAL ONCE
Refills: 0 | Status: COMPLETED | OUTPATIENT
Start: 2023-04-09 | End: 2023-04-09

## 2023-04-09 RX ADMIN — Medication 600 MILLIGRAM(S): at 18:08

## 2023-04-09 RX ADMIN — Medication 975 MILLIGRAM(S): at 18:08

## 2023-04-09 NOTE — ED PROVIDER NOTE - PHYSICAL EXAMINATION
General: Alert and Orientated x 3. No apparent distress.  Head: Normocephalic and atraumatic.  Eyes: PERRLA with EOMI.  Neck: Supple. Trachea midline.   Oral: open 0.5cm lesion on upper gingival line around tooth 10/11. No fluctuance. slight erythema. No purulence. fx of corresponding tooth. No facial edema. no ttp to teeth.   Cardiac: Normal S1 and S2 w/ RRR. No murmurs appreciated. No JVD appreciated.  Pulmonary: CTA bilaterally. No increased WOB. No wheezes or crackles.  Abdominal: Soft, non-tender. (+) bowel sounds appreciated in all 4 quadrants. No hepatosplenomegaly.   Neurologic: No focal sensory or motor deficits. cn2-12 grossly intact   Musculoskeletal: Strength appropriate in all 4 extremities for age with no limited ROM.  Skin: Color appropriate for race. Intact, warm, and well-perfused.  Psychiatric: Appropriate mood and affect. No apparent risk to self or others.

## 2023-04-09 NOTE — ED ADULT NURSE NOTE - OBJECTIVE STATEMENT
33y F AxO x4 came in for left sided mouth pain. Patient reports she began having severe pain (7/10) in the upper left portion of her mouth 3 days ago. Patient stated yesterday she noticed an abscess on the left upper side of the mouth that had popped and was bleeding. Patient has a small area of redness on the gum of the upper left lip 2mm in size. No swelling observed. Patient has full sensation in face. Reports not being able to get a recent appointment at the dentist. Has been taking 200mg of ibuprofen for pain with no relief. Denies any medical hx. Denies fever, chills, headache, blurry vision, numbness, tingling, weakness, dysuria, n/v/d, SOB, and chest pain. Call bell within reach, bed in lowest position.

## 2023-04-09 NOTE — ED PROVIDER NOTE - NSFOLLOWUPCLINICS_GEN_ALL_ED_FT
[facility]  Dental  [address] [place] [id number]  Phone: [phone]  Fax:

## 2023-04-09 NOTE — ED PROVIDER NOTE - NS ED ROS FT
CONSTITUTIONAL: No fevers, no chills, no lightheadedness, no dizziness  EYES: no visual changes, no eye pain  EARS: no ear drainage, no ear pain, no change in hearing  NOSE: no nasal congestion  MOUTH/THROAT: see hpi   CV: No chest pain, no palpitations  RESP: No SOB, no cough  GI: No n/v/d, no abd pain  : no dysuria, no hematuria, no flank pain  MSK: no back pain, no extremity pain  SKIN: no rashes  NEURO: no headache, no focal weakness, no decreased sensation/parasthesias   PSYCHIATRIC: no known mental health issues

## 2023-04-09 NOTE — ED PROVIDER NOTE - NSFOLLOWUPINSTRUCTIONS_ED_ALL_ED_FT
You were seen in the Emergency Department for dental pain. Lab and imaging results, if performed, were discussed with you along with your discharge diagnosis.    You will receive a call to make an appointment with a dentist. List of providers and their information has been given. Follow up with your doctor in 1 week - bring copies of your results if you were given. If you do not have a primary doctor, please call [phone] to find one convenient for you.    A prescription for Peridex mouthwash three times a day was sent to your pharmacy. Take as prescribed, Continue all prescribed medications.     To control your pain at home, you should take Ibuprofen 400 mg along with Tylenol 650mg-1000mg every 6 to 8 hours. Limit your maximum daily Tylenol from all sources to 4000mg. Be aware that many other medications contain acetaminophen which is also known as Tylenol. Taking Tylenol and Ibuprofen together has been shown to be more effective at relieving pain than taking them separately. These are both over the counter medications that you can  at your local pharmacy without a prescription. You need to respect all of the warnings on the bottles. You shouldn’t take these medications for more than a week without following up with your doctor. Both medications come with certain risks and side effects that you need to discuss with your doctor, especially if you are taking them for a prolonged period.    Return to ED for any new or worsening symptoms including but not limited to: development of facial edema, chest pain, shortness of breath, fever, vomiting, focal numbness, weakness or tingling, any severe CP, headache, abdominal pain, back pain.      Rest and keep yourself hydrated with fluids.

## 2023-04-09 NOTE — ED PROVIDER NOTE - CLINICAL SUMMARY MEDICAL DECISION MAKING FREE TEXT BOX
32 y/o f pted with periapical abscess now open since yesterday. some erythema, no fluctuance, warmth. no ttp to tooth 10-11. No f/c, facial edema, n/v. only taking motrin 250mg. Has dental appt april 29. discussed pain control w/ tylenol and motrin. will send peridex mouthwash. expedited dental appt. strict return precautions.

## 2023-04-09 NOTE — ED PROVIDER NOTE - ATTENDING CONTRIBUTION TO CARE
Attending (Renny Domínguez D.O.):  I have personally seen and examined this patient. I have performed a substantive portion of the visit including all aspects of the medical decision making. Resident, fellow, student, and/or ACP note reviewed. I agree on the plan of care except where noted.    33F with left upper tooth filling that broke, saw dentist told she needs either a tooth pulled or root canal, appointment 4/29, here for pain to region. Noticed abscess above tooth, has been cleaning area. Already draining. +tooth sensitivity. Still tolerating PO. Denies fever, chills. Hemodynam stable. No signs of deep space oropharyngeal/neck infx. Tooth itself does not appear infected but + left periappical abscess draining. No immunocompromise hx. Given already draining and no present evidence suspicion for deep space infection, will provide analgesia, rx peridex mouth wash, dc w continued outpatient f/u. Strict return precautions provided with verbal understanding expressed. Stable for dc to f/u with dental.

## 2023-04-09 NOTE — ED PROVIDER NOTE - PATIENT PORTAL LINK FT
You can access the FollowMyHealth Patient Portal offered by [facility] by registering at the following website: [email or web address]. By joining [facility]’s FollowMyHealth portal, you will also be able to view your health information using other applications (apps) compatible with our system.

## 2023-04-09 NOTE — ED PROVIDER NOTE - OBJECTIVE STATEMENT
Patient is a 34 y/o F with hx of hidradenitis suppurativa who presents to the ED with dental pain x3 days. Noticed abscess on upper gum line tooth 10-11. Leision broke open yesterday with small open of blood/white discharge coming out. No discharge today. Taking 250mg motrin and orajel. Saw dentist 3 wks ago, plan for dental work april 29 but could not get appt for current sx. no abx. no f/c, n/v, facial swelling, recent dental work.

## 2023-06-18 ENCOUNTER — EMERGENCY (EMERGENCY)
Facility: HOSPITAL | Age: 33
LOS: 1 days | Discharge: HOME | End: 2023-06-18
Attending: STUDENT IN AN ORGANIZED HEALTH CARE EDUCATION/TRAINING PROGRAM
Payer: MEDICAID

## 2023-06-18 VITALS
SYSTOLIC BLOOD PRESSURE: 132 MMHG | TEMPERATURE: 99 F | HEIGHT: 60 IN | WEIGHT: 175.05 LBS | DIASTOLIC BLOOD PRESSURE: 78 MMHG | HEART RATE: 71 BPM | OXYGEN SATURATION: 100 % | RESPIRATION RATE: 18 BRPM

## 2023-06-18 LAB
ALBUMIN SERPL ELPH-MCNC: 4.1 G/DL — SIGNIFICANT CHANGE UP (ref 3.3–5)
ALP SERPL-CCNC: 68 U/L — SIGNIFICANT CHANGE UP (ref 40–120)
ALT FLD-CCNC: 14 U/L — SIGNIFICANT CHANGE UP (ref 10–45)
ANION GAP SERPL CALC-SCNC: 17 MMOL/L — SIGNIFICANT CHANGE UP (ref 5–17)
APTT BLD: 29 SEC — SIGNIFICANT CHANGE UP (ref 27.5–35.5)
AST SERPL-CCNC: 10 U/L — SIGNIFICANT CHANGE UP (ref 10–40)
BASOPHILS # BLD AUTO: 0.06 K/UL — SIGNIFICANT CHANGE UP (ref 0–0.2)
BASOPHILS NFR BLD AUTO: 0.5 % — SIGNIFICANT CHANGE UP (ref 0–2)
BILIRUB SERPL-MCNC: 0.2 MG/DL — SIGNIFICANT CHANGE UP (ref 0.2–1.2)
BLD GP AB SCN SERPL QL: NEGATIVE — SIGNIFICANT CHANGE UP
BUN SERPL-MCNC: 10 MG/DL — SIGNIFICANT CHANGE UP (ref 7–23)
CALCIUM SERPL-MCNC: 9.1 MG/DL — SIGNIFICANT CHANGE UP (ref 8.4–10.5)
CHLORIDE SERPL-SCNC: 103 MMOL/L — SIGNIFICANT CHANGE UP (ref 96–108)
CO2 SERPL-SCNC: 21 MMOL/L — LOW (ref 22–31)
CREAT SERPL-MCNC: 0.66 MG/DL — SIGNIFICANT CHANGE UP (ref 0.5–1.3)
EGFR: 118 ML/MIN/1.73M2 — SIGNIFICANT CHANGE UP
EOSINOPHIL # BLD AUTO: 0.17 K/UL — SIGNIFICANT CHANGE UP (ref 0–0.5)
EOSINOPHIL NFR BLD AUTO: 1.4 % — SIGNIFICANT CHANGE UP (ref 0–6)
GLUCOSE SERPL-MCNC: 118 MG/DL — HIGH (ref 70–99)
HCG SERPL-ACNC: <2 MIU/ML — SIGNIFICANT CHANGE UP
HCT VFR BLD CALC: 38.4 % — SIGNIFICANT CHANGE UP (ref 34.5–45)
HGB BLD-MCNC: 12.8 G/DL — SIGNIFICANT CHANGE UP (ref 11.5–15.5)
IMM GRANULOCYTES NFR BLD AUTO: 0.2 % — SIGNIFICANT CHANGE UP (ref 0–0.9)
INR BLD: 1.22 RATIO — HIGH (ref 0.88–1.16)
LYMPHOCYTES # BLD AUTO: 3.74 K/UL — HIGH (ref 1–3.3)
LYMPHOCYTES # BLD AUTO: 30.4 % — SIGNIFICANT CHANGE UP (ref 13–44)
MCHC RBC-ENTMCNC: 28.3 PG — SIGNIFICANT CHANGE UP (ref 27–34)
MCHC RBC-ENTMCNC: 33.3 GM/DL — SIGNIFICANT CHANGE UP (ref 32–36)
MCV RBC AUTO: 84.8 FL — SIGNIFICANT CHANGE UP (ref 80–100)
MONOCYTES # BLD AUTO: 1.15 K/UL — HIGH (ref 0–0.9)
MONOCYTES NFR BLD AUTO: 9.3 % — SIGNIFICANT CHANGE UP (ref 2–14)
NEUTROPHILS # BLD AUTO: 7.17 K/UL — SIGNIFICANT CHANGE UP (ref 1.8–7.4)
NEUTROPHILS NFR BLD AUTO: 58.2 % — SIGNIFICANT CHANGE UP (ref 43–77)
NRBC # BLD: 0 /100 WBCS — SIGNIFICANT CHANGE UP (ref 0–0)
PLATELET # BLD AUTO: 258 K/UL — SIGNIFICANT CHANGE UP (ref 150–400)
POTASSIUM SERPL-MCNC: 3.6 MMOL/L — SIGNIFICANT CHANGE UP (ref 3.5–5.3)
POTASSIUM SERPL-SCNC: 3.6 MMOL/L — SIGNIFICANT CHANGE UP (ref 3.5–5.3)
PROCALCITONIN SERPL-MCNC: 0.03 NG/ML — SIGNIFICANT CHANGE UP (ref 0.02–0.1)
PROT SERPL-MCNC: 7.3 G/DL — SIGNIFICANT CHANGE UP (ref 6–8.3)
PROTHROM AB SERPL-ACNC: 14.1 SEC — HIGH (ref 10.5–13.4)
RBC # BLD: 4.53 M/UL — SIGNIFICANT CHANGE UP (ref 3.8–5.2)
RBC # FLD: 14 % — SIGNIFICANT CHANGE UP (ref 10.3–14.5)
RH IG SCN BLD-IMP: POSITIVE — SIGNIFICANT CHANGE UP
SODIUM SERPL-SCNC: 141 MMOL/L — SIGNIFICANT CHANGE UP (ref 135–145)
WBC # BLD: 12.32 K/UL — HIGH (ref 3.8–10.5)
WBC # FLD AUTO: 12.32 K/UL — HIGH (ref 3.8–10.5)

## 2023-06-18 PROCEDURE — 70487 CT MAXILLOFACIAL W/DYE: CPT | Mod: 26,MA

## 2023-06-18 PROCEDURE — 99285 EMERGENCY DEPT VISIT HI MDM: CPT

## 2023-06-18 RX ORDER — AMPICILLIN SODIUM AND SULBACTAM SODIUM 250; 125 MG/ML; MG/ML
3 INJECTION, POWDER, FOR SUSPENSION INTRAMUSCULAR; INTRAVENOUS ONCE
Refills: 0 | Status: COMPLETED | OUTPATIENT
Start: 2023-06-18 | End: 2023-06-18

## 2023-06-18 RX ORDER — MORPHINE SULFATE 50 MG/1
2 CAPSULE, EXTENDED RELEASE ORAL ONCE
Refills: 0 | Status: DISCONTINUED | OUTPATIENT
Start: 2023-06-18 | End: 2023-06-18

## 2023-06-18 RX ORDER — KETOROLAC TROMETHAMINE 30 MG/ML
15 SYRINGE (ML) INJECTION ONCE
Refills: 0 | Status: DISCONTINUED | OUTPATIENT
Start: 2023-06-18 | End: 2023-06-18

## 2023-06-18 RX ADMIN — AMPICILLIN SODIUM AND SULBACTAM SODIUM 200 GRAM(S): 250; 125 INJECTION, POWDER, FOR SUSPENSION INTRAMUSCULAR; INTRAVENOUS at 22:34

## 2023-06-18 RX ADMIN — Medication 15 MILLIGRAM(S): at 22:34

## 2023-06-18 RX ADMIN — MORPHINE SULFATE 2 MILLIGRAM(S): 50 CAPSULE, EXTENDED RELEASE ORAL at 21:56

## 2023-06-18 NOTE — ED PROVIDER NOTE - CLINICAL SUMMARY MEDICAL DECISION MAKING FREE TEXT BOX
33-year-old smoking female, history of intraoral abscess presents with 3 days of likely intraoral abscess that is now spread to the right cheek and submental area.  Will get CT to evaluate for Laurita's versus parotitis and evaluated for extension of intraoral abscess.  Will treat pain and reassess.  concern for Laurita's angina is lower on differential due to no elevated tongue,  ability to hold secretions and no trouble breathing. 33-year-old smoking female, history of intraoral abscess presents with 3 days of likely intraoral abscess that is now spread to the right cheek and submental area.  Will get CT to evaluate for Fabrizio's versus parotitis and evaluated for extension of intraoral abscess.  Will treat pain and reassess.  concern for Laurita's angina is lower on differential due to no elevated tongue,  ability to hold secretions and no trouble breathing.    Alfred Carrillo MD. agree with above. otherwise healthy female presenting with right facial swelling today, noted right upper dental pain x3 days. chills but no fevers. no n/v, dysphagia, or odynophagia. on exam, pt in no resp distress. VSS. neck supple. no jvd. rrr nl s1/s2, no m/r/g. lungs cta. abd soft nt nd. b/l le no edema or ttp. palpable radial and dp/pt pulses, equal. A&ox3. neurologically intact, no focal deficits. skin warm and dry. HEENT: right facial swelling; no stridor; no pooling of secretions; uvula midline; no posterior pharyngeal swelling; ttp to the upper gingival regions around the 2nd/3rd maxillary molars; no pustules visualized;   pt likely with a periodontal abscess. lower suspicion for pta/rpa or ludwigs. pt protecting her airway at this time. will obtain ct max/face w/ iv contrast to eval. Will check CBC to eval WBC, anemia, plt count; CMP to eval for lyte abnormalities, renal and/or liver dysfunction. will start abx here. likely dental consult. dispo pending ED w/u.

## 2023-06-18 NOTE — ED PROVIDER NOTE - ATTENDING CONTRIBUTION TO CARE
I, Alfred Carrillo, performed a history and physical exam of the patient and discussed their management with the resident and/or advanced care provider. I reviewed the resident and/or advanced care provider's note and agree with the documented findings and plan of care except where noted. I was present and available for all procedures.    see mdm

## 2023-06-18 NOTE — ED PROVIDER NOTE - PROGRESS NOTE DETAILS
Alfred Carrillo MD. ct showing periodontal abscess. dental paged and will come see patient. pt's pain she states is improved. Keagan Kerns DO, PGY3:Patient valuated by dental would like repeat dose of IV antibiotics patient will be CDU for repeat antibiotics and can be discharged home on antibiotics and symptoms improved, other dental reccomendaitons Attending Joanna Dennis: pt seen by dental recommends cdu for IV abx

## 2023-06-18 NOTE — ED ADULT NURSE NOTE - OBJECTIVE STATEMENT
33y female no PMH daily smoker to the ED from home via triage c/o of facial pain. Pt reports that pain started a few days as a tooth ache on the right side and has since spread down to the jaw area. Pt now reports significant swelling in the right side of the face which is visible upon assessment. Pt denies fevers but states that pt feels as though she has chills. Pt reports pain with opening mouth and swallowing. Stretcher in lowest position and locked, appropriate side rails in place, room cleared of clutter and safety hazards, call bell in reach- pt oriented to use, blankets given for comfort

## 2023-06-18 NOTE — ED PROVIDER NOTE - OBJECTIVE STATEMENT
33-year-old female smoker history of intraoral abscesses presents to the ED with 3 days of right-sided tooth pain,  Dull, nonradiating, tender to touch including her right cheek and submental area with some chills.  Has not been able to see her dentist.

## 2023-06-18 NOTE — ED ADULT NURSE NOTE - NSFALLUNIVINTERV_ED_ALL_ED
Bed/Stretcher in lowest position, wheels locked, appropriate side rails in place/Call bell, personal items and telephone in reach/Instruct patient to call for assistance before getting out of bed/chair/stretcher/Non-slip footwear applied when patient is off stretcher/[place] to call system/Physically safe environment - no spills, clutter or unnecessary equipment/Purposeful proactive rounding/Room/bathroom lighting operational, light cord in reach

## 2023-06-18 NOTE — ED PROVIDER NOTE - CCCP TRG CHIEF CMPLNT
I have personally seen and examined this patient.    I have reviewed all pertinent clinical information and reviewed all relevant imaging and diagnostic studies personally.   I counseled the patient about diagnostic testing and treatment plan. All questions were answered.   I discussed recommendations with the primary team. facial pain

## 2023-06-18 NOTE — ED PROVIDER NOTE - PHYSICAL EXAMINATION
Const: Well-nourished, Well-developed, appearing stated age.  Eyes: no conjunctival injection, and symmetrical lids.  HEENT: right cheek swelling, TTP.  No intraoral abscess visualized.   RESP: Unlabored respiratory effort. Clear to auscultation bilaterally.  GI: Nontender/Nondistended, No CVA tenderness b/l.   MSK: Normocephalic/Atraumatic, Lower Extremities w/o calf tenderness or edema b/l.   Skin: Warm, dry and intact.   Neuro: CNs II-XII grossly intact. Motor & Sensation grossly intact.  Psych: Awake, Alert, & Oriented (AAO) x3. Appropriate mood and affect.

## 2023-06-19 VITALS
TEMPERATURE: 98 F | DIASTOLIC BLOOD PRESSURE: 72 MMHG | SYSTOLIC BLOOD PRESSURE: 119 MMHG | HEART RATE: 61 BPM | OXYGEN SATURATION: 99 % | RESPIRATION RATE: 16 BRPM

## 2023-06-19 PROCEDURE — 96365 THER/PROPH/DIAG IV INF INIT: CPT | Mod: XU

## 2023-06-19 PROCEDURE — 70487 CT MAXILLOFACIAL W/DYE: CPT | Mod: MA

## 2023-06-19 PROCEDURE — 41800 DRAINAGE OF GUM LESION: CPT

## 2023-06-19 PROCEDURE — G0378: CPT

## 2023-06-19 PROCEDURE — 80053 COMPREHEN METABOLIC PANEL: CPT

## 2023-06-19 PROCEDURE — 84145 PROCALCITONIN (PCT): CPT

## 2023-06-19 PROCEDURE — 84702 CHORIONIC GONADOTROPIN TEST: CPT

## 2023-06-19 PROCEDURE — 85025 COMPLETE CBC W/AUTO DIFF WBC: CPT

## 2023-06-19 PROCEDURE — 86850 RBC ANTIBODY SCREEN: CPT

## 2023-06-19 PROCEDURE — 96376 TX/PRO/DX INJ SAME DRUG ADON: CPT | Mod: XU

## 2023-06-19 PROCEDURE — 96375 TX/PRO/DX INJ NEW DRUG ADDON: CPT | Mod: XU

## 2023-06-19 PROCEDURE — 85610 PROTHROMBIN TIME: CPT

## 2023-06-19 PROCEDURE — 36415 COLL VENOUS BLD VENIPUNCTURE: CPT

## 2023-06-19 PROCEDURE — 86900 BLOOD TYPING SEROLOGIC ABO: CPT

## 2023-06-19 PROCEDURE — 99236 HOSP IP/OBS SAME DATE HI 85: CPT

## 2023-06-19 PROCEDURE — 85730 THROMBOPLASTIN TIME PARTIAL: CPT

## 2023-06-19 PROCEDURE — 99284 EMERGENCY DEPT VISIT MOD MDM: CPT | Mod: 25

## 2023-06-19 PROCEDURE — 86901 BLOOD TYPING SEROLOGIC RH(D): CPT

## 2023-06-19 PROCEDURE — 96366 THER/PROPH/DIAG IV INF ADDON: CPT | Mod: XU

## 2023-06-19 RX ORDER — ACETAMINOPHEN 500 MG
1000 TABLET ORAL ONCE
Refills: 0 | Status: COMPLETED | OUTPATIENT
Start: 2023-06-19 | End: 2023-06-19

## 2023-06-19 RX ORDER — OXYCODONE HYDROCHLORIDE 5 MG/1
5 TABLET ORAL ONCE
Refills: 0 | Status: DISCONTINUED | OUTPATIENT
Start: 2023-06-19 | End: 2023-06-19

## 2023-06-19 RX ORDER — KETOROLAC TROMETHAMINE 30 MG/ML
15 SYRINGE (ML) INJECTION ONCE
Refills: 0 | Status: DISCONTINUED | OUTPATIENT
Start: 2023-06-19 | End: 2023-06-19

## 2023-06-19 RX ORDER — OXYCODONE HYDROCHLORIDE 5 MG/1
1 TABLET ORAL
Qty: 12 | Refills: 0
Start: 2023-06-19 | End: 2023-06-21

## 2023-06-19 RX ORDER — AMPICILLIN SODIUM AND SULBACTAM SODIUM 250; 125 MG/ML; MG/ML
3 INJECTION, POWDER, FOR SUSPENSION INTRAMUSCULAR; INTRAVENOUS EVERY 6 HOURS
Refills: 0 | Status: DISCONTINUED | OUTPATIENT
Start: 2023-06-19 | End: 2023-06-22

## 2023-06-19 RX ADMIN — Medication 400 MILLIGRAM(S): at 02:31

## 2023-06-19 RX ADMIN — AMPICILLIN SODIUM AND SULBACTAM SODIUM 200 GRAM(S): 250; 125 INJECTION, POWDER, FOR SUSPENSION INTRAMUSCULAR; INTRAVENOUS at 05:12

## 2023-06-19 RX ADMIN — Medication 400 MILLIGRAM(S): at 14:51

## 2023-06-19 RX ADMIN — OXYCODONE HYDROCHLORIDE 5 MILLIGRAM(S): 5 TABLET ORAL at 13:14

## 2023-06-19 RX ADMIN — Medication 1000 MILLIGRAM(S): at 05:15

## 2023-06-19 RX ADMIN — AMPICILLIN SODIUM AND SULBACTAM SODIUM 200 GRAM(S): 250; 125 INJECTION, POWDER, FOR SUSPENSION INTRAMUSCULAR; INTRAVENOUS at 18:04

## 2023-06-19 RX ADMIN — Medication 15 MILLIGRAM(S): at 09:15

## 2023-06-19 RX ADMIN — AMPICILLIN SODIUM AND SULBACTAM SODIUM 200 GRAM(S): 250; 125 INJECTION, POWDER, FOR SUSPENSION INTRAMUSCULAR; INTRAVENOUS at 12:05

## 2023-06-19 NOTE — ED CDU PROVIDER DISPOSITION NOTE - NSFOLLOWUPINSTRUCTIONS_ED_ALL_ED_FT
1) Follow-up with your Primary Medical Doctor or referred doctor. Call today / next business day for prompt follow-up.  2) Return to Emergency room for any worsening or persistent pain, weakness, fever, or any other concerning symptoms.  3) See attached instruction sheets for additional information, including information regarding signs and symptoms to look out for, reasons to seek immediate care and other important instructions.  4) Follow-up with any specialists as discussed / noted as well. 1. Rest. Stay hydrated.  2. Continue your current home medications. Take antibiotic Augmentin as prescribed. Take Tylenol and/or Motrin over the counter as needed for pain. Take oxycodone as prescribed for pain not relieved by tylenol or motrin. Oxycodone can make you sleepy so do not drive while taking it.   3. Follow up with your dentist next week or [facility]. Call for appointment. Follow-up with your medical doctor in 2-3 days.  Bring your results with you for follow-up.  4. Return to the ER if you have any worsening symptoms, difficulty eating/swallowing, worsening swelling, chest pain, difficulty breathing, fevers, chills, weakness, or any other concerns. 1. Rest. Stay hydrated.  2. Continue your current home medications. Take antibiotic Augmentin as prescribed. Take Tylenol and/or Motrin over the counter as needed for pain. Take oxycodone as prescribed for pain not relieved by tylenol or motrin. Oxycodone can make you sleepy so do not drive while taking it.   3. Follow up with your dentist next week or [facility]. Call for appointment. Follow-up with your medical doctor in 2-3 days.  Bring your results with you for follow-up.  4. Return to the ER if you have any worsening symptoms, difficulty eating/swallowing, worsening swelling, chest pain, difficulty breathing, fevers, chills, weakness, or any other concerns.    [facility]  Dental  [address] [place] [address]  Phone: [phone]

## 2023-06-19 NOTE — ED ADULT NURSE REASSESSMENT NOTE - NS ED NURSE REASSESS COMMENT FT1
07.00 Am Received the Pt from  RN Rojelio Benz. Pt is Observed for Dental Abscess .Received the Pt A&OX 4 obeys commands Yamila N/V/D fever chills cp SOB   Comfort care & safety measures continued  IV site looks clean & dry no signs of infiltration noted pt denies  pain IV site .  Pt is advised to call for help  call bell with in the reach pt verbalized the understanding .  pending CDU  MD johnston . GCS 15/15 A&OX 4 PERRLA  size 3 Strong upper & lower extremities steady gait   No facial droop  No Hand Leg drop denies numbness tingling Pt C/O  Dental  Pain 8/10 Medicated as per order Continue to monitor
18.30 Pt is evaluated by CDU MD Umberto Minor . pt is feeling better.  Pt is discharged . Ml out  . JOSUE Pacheco explained the follow up care & gave the discharge summary  . Pt has stable vitals steady gait A&OX 4 at the time of Discharge
How Severe Is Your Skin Lesion?: mild
Is This A New Presentation, Or A Follow-Up?: Skin Lesion
Received patient from Ernie RN, patient at [facility] mental status, able to make needs known, VSS, patient agreeable to plan of care, comfort and safety provided. Pt endorsing pain in mouth, given ofirmev. Pt daughter at bedside.

## 2023-06-19 NOTE — ED ADULT NURSE REASSESSMENT NOTE - NSFALLUNIVINTERV_ED_ALL_ED
Bed/Stretcher in lowest position, wheels locked, appropriate side rails in place/Call bell, personal items and telephone in reach/Instruct patient to call for assistance before getting out of bed/chair/stretcher/Non-slip footwear applied when patient is off stretcher/[place] to call system/Physically safe environment - no spills, clutter or unnecessary equipment/Purposeful proactive rounding/Room/bathroom lighting operational, light cord in reach

## 2023-06-19 NOTE — ED CDU PROVIDER INITIAL DAY NOTE - ATTENDING APP SHARED VISIT CONTRIBUTION OF CARE
Attending MD Dennis:  I have personally performed a face to face diagnostic evaluation on this patient.  I have reviewed the ACP note and agree with the history, exam, and plan of care, except as noted.

## 2023-06-19 NOTE — CONSULT NOTE ADULT - SUBJECTIVE AND OBJECTIVE BOX
Patient is a 33y old  Female who presents with a chief complaint of right sided facial swelling starting this morning.     HPI: Patient states that facial swelling started this morning. Last 3 days the patient experience immense pain on the right side. Patient states her last visit to the dentist was late April - supposed to get tooth #12 extracted. Patient reports feeling slightly feverish.       PAST MEDICAL & SURGICAL HISTORY:  Smoker      No significant past surgical history    MEDICATIONS  (STANDING):    MEDICATIONS  (PRN):      Allergies    No Known Allergies    Intolerances        FAMILY HISTORY:      *SOCIAL HISTORY: (guardian or who pt came with), (smoking hx)    *Last Dental Visit:    Vital Signs Last 24 Hrs  T(C): 37.6 (18 Jun 2023 22:20), Max: 37.6 (18 Jun 2023 22:20)  T(F): 99.6 (18 Jun 2023 22:20), Max: 99.6 (18 Jun 2023 22:20)  HR: 66 (18 Jun 2023 22:20) (66 - 74)  BP: 140/75 (18 Jun 2023 22:20) (132/78 - 151/93)  BP(mean): --  RR: 16 (18 Jun 2023 22:20) (16 - 18)  SpO2: 98% (18 Jun 2023 22:20) (97% - 100%)    Parameters below as of 18 Jun 2023 22:20  Patient On (Oxygen Delivery Method): room air        EOE:  TMJ ( - ) clicks                    (  - ) pops                    ( - ) crepitus             Mandible FROM             Facial bones and MOM grossly intact             ( - ) trismus             ( + ) LAD - slight tenderness along the neck              ( + ) swelling - right sided facial swelling, not extending to the inferior orbit             ( + ) asymmetry             ( + ) palpation             ( - ) SOB             ( - ) dysphagia             ( - ) LOC    IOE:  permanent dentition: multiple carious teeth           labial/buccal mucosa WNL           ( + ) percussion #2,3           ( + ) palpation apical to #2,3.  Firm, cellulitic to palpation            ( + ) swelling - cheek swelling, no swelling at the mucobuccal fold.    Mobility: No mobility   Caries: #12 gross caries, no obvious signs of caries upper right     Radiographs: PAN and PA taken and interpreted. #1 has widened PDL and PARL. #2 and #3, no widened PDL. Unable to determine PARL due to overlap. #12 gross caries. #1,16,17,32 impacted. #3 PA may demonstrate PARL. #3 and #4 PA - cervical burnout, no caries.     CT taken and interpreted as "Soft tissue swelling centered about the right maxillary alveolus with extension into the infratemporal space. Thin periodontal collection adjacent to the right maxillary second and third molars measuring 1.2 x 0.2 x 0.8 cm compatible with a small periodontal abscess."    LABS:                        12.8   12.32 )-----------( 258      ( 18 Jun 2023 22:17 )             38.4     06-18    141  |  103  |  10  ----------------------------<  118<H>  3.6   |  21<L>  |  0.66    Ca    9.1      18 Jun 2023 22:17    TPro  7.3  /  Alb  4.1  /  TBili  0.2  /  DBili  x   /  AST  10  /  ALT  14  /  AlkPhos  68  06-18    WBC Count: 12.32 K/uL *H* [3.80 - 10.50] (06-18 @ 22:17)    Platelet Count - Automated: 258 K/uL [150 - 400] (06-18 @ 22:17)    INR: 1.22 ratio *H* [0.88 - 1.16] (06-18 @ 22:17)    ASSESSMENT: #1 PARL with widened PDL. #3 possible MB PARL and large amalgam that may have recurrent caries. No caries noted on clinical exam.     RECOMMENDATIONS:   1) complete 2 courses of IV Abx per ED team and discharge on oral Abx   2) Pain meds as per ED   3) Dental F/U with outpatient dentist for extraction of #1 and evaluation of #2 and #3    Marcelino Catalan, TIMOTHY #[id number]

## 2023-06-19 NOTE — ED CDU PROVIDER INITIAL DAY NOTE - CLINICAL SUMMARY MEDICAL DECISION MAKING FREE TEXT BOX
Attending Joanna Dennis: 34 yo female foud to have dental infection, seen by dental placed in cdu for iv abx. airway intact

## 2023-06-19 NOTE — ED CDU PROVIDER DISPOSITION NOTE - NSFOLLOWUPCLINICS_GEN_ALL_ED_FT
[facility]  Dental  [address] [place] [id number]  Phone: [phone]  Fax:

## 2023-06-19 NOTE — ED CDU PROVIDER INITIAL DAY NOTE - OBJECTIVE STATEMENT
33-year-old female smoker history of intraoral abscesses presents to the ED with 3 days of right-sided tooth pain,  Dull, nonradiating, tender to touch including her right cheek and submental area with some chills. Patient states that facial swelling started this morning. Patient states her last visit to the dentist was late April - supposed to get tooth #12 extracted. Patient reports feeling slightly feverish.   In ED, patient had mild leukocytosis and CT max/fac with contrast showing Soft tissue swelling centered about the right maxillary alveolus with extension into the infratemporal space. Thin periodontal collection adjacent to the right maxillary second and third molars measuring 1.2 x 0.2 x 0.8 cm compatible with a small periodontal abscess. Pt was evaluated by Dental and started on IV Unasyn. Pt sent to CDU for frequent reeval, vitals q 4hrs, iv abx, pain control.

## 2023-06-19 NOTE — ED CDU PROVIDER INITIAL DAY NOTE - PROGRESS NOTE DETAILS
pt with continued pain and facial swelling. dental recalled. they will come see pt. -Miriam Pacheco PA-C pt was re-evaluated by dental, s/p I&D pt was re-evaluated by dental, s/p I&D. feeling better. facial swelling improved. discussed with Dr. Shipman, pt stable for d/c home. -Miriam Pacheco PA-C

## 2023-06-19 NOTE — ED CDU PROVIDER DISPOSITION NOTE - CLINICAL COURSE
33-year-old female smoker history of intraoral abscesses presents to the ED with 3 days of right-sided tooth pain,  Dull, nonradiating, tender to touch including her right cheek and submental area with some chills. Patient states that facial swelling started this morning. Patient states her last visit to the dentist was late April - supposed to get tooth #12 extracted. Patient reports feeling slightly feverish.   In ED, patient had mild leukocytosis and CT max/fac with contrast showing Soft tissue swelling centered about the right maxillary alveolus with extension into the infratemporal space. Thin periodontal collection adjacent to the right maxillary second and third molars measuring 1.2 x 0.2 x 0.8 cm compatible with a small periodontal abscess. Pt was evaluated by Dental and started on IV Unasyn. Pt sent to CDU for frequent reeval, vitals q 4hrs, iv abx, pain control. 33-year-old female smoker history of intraoral abscesses presents to the ED with 3 days of right-sided tooth pain,  Dull, nonradiating, tender to touch including her right cheek and submental area with some chills. Patient states that facial swelling started this morning. Patient states her last visit to the dentist was late April - supposed to get tooth #12 extracted. Patient reports feeling slightly feverish.   In ED, patient had mild leukocytosis and CT max/fac with contrast showing Soft tissue swelling centered about the right maxillary alveolus with extension into the infratemporal space. Thin periodontal collection adjacent to the right maxillary second and third molars measuring 1.2 x 0.2 x 0.8 cm compatible with a small periodontal abscess. Pt was evaluated by Dental and started on IV Unasyn. Pt sent to CDU for frequent reeval, vitals q 4hrs, iv abx, pain control. pt seen by dental again and had abscess I&D. symptoms improved. pt was discharged home.

## 2023-06-19 NOTE — ED CDU PROVIDER INITIAL DAY NOTE - DETAILS
33-year-old female p/w periodontal abscess  frequent reeval, vitals q 4hrs, iv abx, pain control. Dental following

## 2023-06-19 NOTE — PROGRESS NOTE ADULT - SUBJECTIVE AND OBJECTIVE BOX
Please refer to previous dental consult note for additional information.     Dental re-consulted due to patient's continued facial pain and swelling.  Patient reports pain control w/ meds; increased pressure in mouth causing discomfort.    MEDICATIONS  (STANDING):  ampicillin/sulbactam  IVPB 3 Gram(s) IV Intermittent every 6 hours    Allergies    No Known Allergies    Vital Signs Last 24 Hrs  T(C): 36.6 (19 Jun 2023 07:43), Max: 37.6 (18 Jun 2023 22:20)  T(F): 97.9 (19 Jun 2023 07:43), Max: 99.6 (18 Jun 2023 22:20)  HR: 59 (19 Jun 2023 07:43) (59 - 74)  BP: 121/69 (19 Jun 2023 07:43) (120/72 - 151/93)  BP(mean): --  RR: 16 (19 Jun 2023 07:43) (16 - 18)  SpO2: 99% (19 Jun 2023 07:43) (97% - 100%)    Parameters below as of 19 Jun 2023 07:43  Patient On (Oxygen Delivery Method): room air    LABS:                        12.8   12.32 )-----------( 258      ( 18 Jun 2023 22:17 )             38.4     06-18    141  |  103  |  10  ----------------------------<  118<H>  3.6   |  21<L>  |  0.66    Ca    9.1      18 Jun 2023 22:17    TPro  7.3  /  Alb  4.1  /  TBili  0.2  /  DBili  x   /  AST  10  /  ALT  14  /  AlkPhos  68  06-18    WBC Count: 12.32 K/uL *H* [3.80 - 10.50] (06-18 @ 22:17)  Platelet Count - Automated: 258 K/uL [150 - 400] (06-18 @ 22:17)  INR: 1.22 ratio *H* [0.88 - 1.16] (06-18 @ 22:17)    EOE:     ( + ) swelling - right sided facial swelling             ( + ) asymmetry             ( + ) palpation             ( - ) SOB             ( - ) dysphagia    IOE: ( + ) swelling: fluctuant vestibular swelling apical to #2-4         (+) palpation: #2-4    Rads reviewed from previous dental consult.    Assessment: Acute symptomatic periapical abscess spanning #2-4    Procedure: Limited clinical exam completed w/ patient's verbal consent. Discussed findings w/ patient, all questions answered. Recommended I&D- discussed procedure w/ patient, RBA of tx reviewed, all questions answered. Verbal and written consent obtained.  Applied 18% topical benzocaine. Administered 1 carpule of 3% carbocaine AND 2 carpules of 2% lidocaine w/ 1:100,000 via infiltrations w/ carpule and needle change between each injection. Incised to bone w/ 15 blade. Fascial planes blunt dissected. Purulence appreciated. Irrigated w/ sterile saline. Patient denied penrose drain. POIG verbally. Emphasized importance of following-up w/ outpatient dentist for tx. Patient understood, all questions answered.     Recommendations:  1) continue IV abx, discharge w/ abx as per ED team  2) Pain meds as per ED   3) Dental F/U with outpatient dentist comprehensive dental care    Kyra Gordon DDS #[id number]

## 2023-06-19 NOTE — ED CDU PROVIDER INITIAL DAY NOTE - NS ED ATTENDING STATEMENT MOD
This was a shared visit with the ANGELINA. I reviewed and verified the documentation and independently performed the documented:

## 2023-06-19 NOTE — ED CDU PROVIDER DISPOSITION NOTE - ATTENDING APP SHARED VISIT CONTRIBUTION OF CARE
34yo female no pmhx presents with R facial swelling and tooth pain. Patient noted to have periodontal abscess. Patient with persistent swelling with mild improvement. Labs reviewed, temp 99 oral with mild chills. Pt tolerating PO, no difficulty breathing. On exam, R facial swelling, oropharynx clear. + submandibular LAD. Neck supple. Dental consult appreciated. Will re consult them today, continue IV antibiotics and supportive care.     Spoke to Dental to re evaluate patient for I and D. I and D completed with Dental. Discussed with patient to monitor in CDU for swelling, states she would like to go home. Strict return precautions, follow up plan and antibiotics discussed.

## 2023-09-14 ENCOUNTER — TRANSCRIPTION ENCOUNTER (OUTPATIENT)
Age: 33
End: 2023-09-14

## 2023-09-14 ENCOUNTER — EMERGENCY (EMERGENCY)
Facility: HOSPITAL | Age: 33
LOS: 1 days | Discharge: HOME | End: 2023-09-14
Attending: STUDENT IN AN ORGANIZED HEALTH CARE EDUCATION/TRAINING PROGRAM
Payer: MEDICAID

## 2023-09-14 VITALS
DIASTOLIC BLOOD PRESSURE: 75 MMHG | HEART RATE: 71 BPM | SYSTOLIC BLOOD PRESSURE: 108 MMHG | RESPIRATION RATE: 18 BRPM | TEMPERATURE: 99 F | OXYGEN SATURATION: 100 %

## 2023-09-14 VITALS
HEART RATE: 73 BPM | OXYGEN SATURATION: 100 % | TEMPERATURE: 98 F | DIASTOLIC BLOOD PRESSURE: 85 MMHG | SYSTOLIC BLOOD PRESSURE: 130 MMHG | WEIGHT: 169.98 LBS | RESPIRATION RATE: 17 BRPM

## 2023-09-14 LAB
RAPID RVP RESULT: SIGNIFICANT CHANGE UP
S PYO AG SPEC QL IA: NEGATIVE — SIGNIFICANT CHANGE UP
SARS-COV-2 RNA SPEC QL NAA+PROBE: SIGNIFICANT CHANGE UP

## 2023-09-14 PROCEDURE — 86308 HETEROPHILE ANTIBODY SCREEN: CPT

## 2023-09-14 PROCEDURE — 99283 EMERGENCY DEPT VISIT LOW MDM: CPT

## 2023-09-14 PROCEDURE — 0225U NFCT DS DNA&RNA 21 SARSCOV2: CPT

## 2023-09-14 PROCEDURE — 87880 STREP A ASSAY W/OPTIC: CPT

## 2023-09-14 PROCEDURE — 99285 EMERGENCY DEPT VISIT HI MDM: CPT

## 2023-09-14 PROCEDURE — 87081 CULTURE SCREEN ONLY: CPT

## 2023-09-14 RX ORDER — CYCLOBENZAPRINE HYDROCHLORIDE 10 MG/1
1 TABLET, FILM COATED ORAL
Qty: 5 | Refills: 0
Start: 2023-09-14 | End: 2023-09-18

## 2023-09-14 RX ORDER — ACETAMINOPHEN 500 MG
975 TABLET ORAL ONCE
Refills: 0 | Status: COMPLETED | OUTPATIENT
Start: 2023-09-14 | End: 2023-09-14

## 2023-09-14 RX ORDER — IBUPROFEN 200 MG
600 TABLET ORAL ONCE
Refills: 0 | Status: COMPLETED | OUTPATIENT
Start: 2023-09-14 | End: 2023-09-14

## 2023-09-14 RX ADMIN — Medication 975 MILLIGRAM(S): at 11:11

## 2023-09-14 RX ADMIN — Medication 600 MILLIGRAM(S): at 11:11

## 2023-09-14 NOTE — ED PROVIDER NOTE - NSFOLLOWUPINSTRUCTIONS_ED_ALL_ED_FT
Please follow up with your primary care doctor within 1 week.  *Bring all printed lab/test results to your appointment(s).*    Take ibuprofen 400-600mg every 6 hrs as needed for pain. Take with food  Take acetaminophen 500-1000mg every 6 hrs as needed for pain. DO NOT EXCEED 4000mg DAILY.  Take ___ as prescribed    Return to the ED for worsening pain, numbness, tingling, weakness, or any other concerns. Please follow up with your primary care doctor within 1 week.  *Bring all printed lab/test results to your appointment(s).*    Take ibuprofen 400-600mg every 6 hrs as needed for pain. Take with food  Take acetaminophen 500-1000mg every 6 hrs as needed for pain. DO NOT EXCEED 4000mg DAILY.    For your neck pain/muscle spasm, a prescription for Flexeril is available for you to  at your pharmacy. Please read and adhere to the instructions for use available on the packaging. Additionally, please read the warnings on the packaging before use. If you have any questions regarding your prescription, you may refer them to the pharmacist.    Return to the ED for worsening pain, numbness, tingling, weakness, or any other concerns.

## 2023-09-14 NOTE — ED ADULT TRIAGE NOTE - CHIEF COMPLAINT QUOTE
full body pain, started in neck pain x2 weeks ago now radiating to wrists and legs, also complaining throat pain

## 2023-09-14 NOTE — ED PROVIDER NOTE - ATTENDING CONTRIBUTION TO CARE
I, Kofi Chapman, have performed a history and physical exam on this patient, and discussed their management with the ANGELINA. I have fully participated in the care of this patient. I agree with the history, physical exam, and plan as documented by the ANGELINA, unless reported as otherwise below:    33-year-old female with no significant past medical history, daily smoker, presenting to the emergency department for complaint of left-sided paraspinal neck pain since the beginning of September.  Denies trauma.  Range of motion intact in neck with mild increase in pain.  Reports subjective chills but no fevers.  Denies nausea or vomiting.  Denies headache.  1 day of mild sore throat. Denies difficulty speaking.  Denies chest pain or shortness of breath.  Denies vision changes.  Has been taking ibuprofen with mild improvement.  Patient also reporting tingling and pains in bilateral hands and wrists and legs intermittently.  Also reports transient episode of swelling in the left wrist anteriorly and right wrist posteriorly.  No pain with range of motion in fingers or wrists bilaterally.  No overlying skin color changes.  Denies history of similar symptoms in the past.  Denies cough or congestion.  States she is presently menstruating.  Denies changes in bowel movements or urination.  Denies weakness in her extremities.  Denies room spinning dizziness or lightheadedness.    Gen: Alert. Ox3. NAD. Well appearing.  HEENT: Atraumatic. Mucous membranes moist. No exudates.  CV: RRR. No significant LE edema.   Resp: Unlabored-respirations. CTAB.   GI: Abdomen non tender to palpation, soft.   Skin/MSK: No open wounds.   Neuro: EOMI. Pupils ERRL. Following commands. Sensation and strength intact throughout extremities x 4. Cranial nerves II through XII intact.  Psych: Appropriate mood, cooperative.     VSS    Differential diagnosis includes, but is not limited to: Consider cervical radiculopathy, no trauma to suggest C-spine fracture or other injury.  Consider muscle spasm, strain/sprain.  Vital signs stable in ED, afebrile, less likely infectious process but consider occult viral illness, pharyngitis w/ 1 day of mild sore throat.  Neurovascularly intact in extremities x4 without signs of swelling at this time. No weakness on exam.  Range of motion intact throughout all joints without significant pain and no swelling or erythema appreciated bilateral wrist.  Consider myalgias with underlying viral illness.  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  Will obtain work-up screening for viral illness.  Provide symptomatic treatment.  Likely plan for discharge home with outpatient follow-up as needed, and muscle relaxer for concern for muscle spasm in left neck.  Patient requesting work note, will provide this. Pt HDS at time of initial ED evaluation.     Please refer to progress notes/disposition for additional decision making in patient's care.

## 2023-09-14 NOTE — ED PROVIDER NOTE - MUSCULOSKELETAL, MLM
Left upper trapezial tenderness/hyperspacticity, no midline spinal tenderness/deformities. 5/5 strength throughout. ROM intact all 4 ext. Steady gait.

## 2023-09-14 NOTE — ED PROVIDER NOTE - OBJECTIVE STATEMENT
33-year-old female daily smoker no reported past medical history presents with multiple complaints.  Patient reports 2 weeks of neck pain described as "charley horse" intermittently worse throughout the day with movement, intermittently radiates down both arms worse in the left arm with tingling sensation.  Unable to identify alleviating/exacerbating factors, minimal relief from ibuprofen last taken yesterday.  Denies neck trauma or excessive activity preceding pain.  Also reports left lower extremity numbness and low back pain occasionally.  Also reports 1 day of sore throat and anterior neck swelling, denies associated cough, rhinorrhea, fever, chills.  Denies saddle anesthesia, urinary retention/incontinence, fever, chills.

## 2023-09-14 NOTE — ED ADULT NURSE NOTE - DISCHARGE DATE/TIME
[facility]    Subjective:        Estefania Shaw is a 40 y.o. female who  has a past medical history of Disorder of kidney and ureter, Hyperlipidemia, and Hypertension.  She presents to clinic today for follow-up of chronic medical conditions.  Patient currently does not have any complaints today.  The patient was taken 25 units of Lantus 2 weeks ago increased her Lantus to 32 units at night due to her morning blood glucose level being in the 160s.  Since patient has increased her Lantus her morning glucose levels have been in the 140s.  Patient denies lightheadedness, dizziness, chest pain, shortness a breath, abdominal pain, nausea, vomiting, hematuria, hematochezia, dysuria, polydipsia, polyuria or peripheral edema.    Review of Systems:  10 point ROS negative except for HPI    Objective:   Vital Signs:  Vitals:    11/03/22 1243   BP: 125/82   Pulse: 89   Resp: 18   Temp: 98.1 °F (36.7 °C)          Body mass index is 43.58 kg/m².     General:  Well developed, well nourished, no acute respiratory distress  Head: Normocephalic, atraumatic  Eyes: PERRL, EOMI, anicteric sclera  Throat: No posterior pharyngeal erythema or exudate, no tonsillar exudate  Neck: supple, normal ROM, no thyromegaly   CVS:  RRR, S1 and S2 normal, no murmurs, no added heart sounds, rubs, gallops, 2+ peripheral pulses  Resp:  Lungs clear to auscultation bilaterally, no wheezes, rales, or rhonci  GI:  Abdomen soft, non-tender, non-distended, normoactive bowel sounds  MSK:  No muscle atrophy, cyanosis, peripheral edema, full range of motion  Skin:  No rashes, ulcers, erythema  Neuro:  Alert and oriented x3, No focal neuro deficits, CNII-XII grossly intact  Psych:  Appropriate mood and affect         Laboratory:  Lab Results   Component Value Date    WBC 4.1 (L) 08/26/2022    HGB 12.1 08/26/2022    HCT 41.3 08/26/2022     08/26/2022    MCV 89.8 08/26/2022    RDW 14.0 08/26/2022    Lab Results   Component Value  "Date     08/26/2022    K 4.8 08/26/2022    CO2 21 (L) 08/26/2022    BUN 25.4 (H) 08/26/2022    CREATININE 2.18 (H) 08/26/2022    CALCIUM 10.0 08/26/2022    MG 2.10 08/26/2022    PHOS 2.9 08/26/2022      Lab Results   Component Value Date    HGBA1C 5.9 09/16/2017     (H) 09/16/2017    CREATININE 2.18 (H) 08/26/2022    CREATRANDUR 48.8 03/08/2022    PROTEINURINE <6.8 03/08/2022    Lab Results   Component Value Date    TSH 3.470 09/18/2017    TRZJPR6ICQJ 1.11 09/18/2017                .labDM:   Lab Results   Component Value Date    HGBA1C 5.9 09/16/2017     (H) 09/16/2017    CREATININE 2.18 (H) 08/26/2022    CREATRANDUR 48.8 03/08/2022    PROTEINURINE <6.8 03/08/2022     Thyroid:   Lab Results   Component Value Date    TSH 3.470 09/18/2017    [id number] 1.11 09/18/2017     Anemia:   Lab Results   Component Value Date    IRON 103 03/31/2022    TIBC 222 03/31/2022    FERRITIN 1,448.03 03/31/2022       Current Medications:  Current Outpatient Medications   Medication Instructions    ACCU-CHEK GUIDE ME GLUCOSE MTR Misc USE TO CHECK BLOOD GLUCOSE ONCE DAILY    ACCU-CHEK GUIDE TEST STRIPS Strp 2 each, Intradermal, 2 times daily, Use to check blood glucose    ACCU-CHEK SOFTCLIX LANCETS [facility] CHECK BLOOD SUGAR ONCE DAILY    amLODIPine (NORVASC) 5 mg, Oral, Daily    BD ANGEL 2ND GEN PEN NEEDLE 32 gauge x 5/32" Ndle USE AS DIRECTED DAILY    famotidine (PEPCID) 20 mg, Oral, 2 times daily    glimepiride (AMARYL) 4 mg, Oral, Daily    ketoconazole (NIZORAL) 100 mg, Oral, Daily    labetaloL (NORMODYNE) 100 mg, Oral, 2 times daily    LANTUS SOLOSTAR U-100 INSULIN 30 Units, Subcutaneous, Nightly    LOKELMA 10 g, Oral, Daily    magnesium oxide (MAG-OX) 400 mg (241.3 mg magnesium) tablet 2 tablets, Oral, Daily    multivitamin (THERAGRAN) per tablet 1 tablet, Oral    mycophenolate (MYFORTIC) 360 mg, Oral, 2 times daily    nystatin (MYCOSTATIN) 100,000 unit/mL suspension 1 mL, Oral, Daily    sulfamethoxazole-trimethoprim " 800-160mg (BACTRIM DS) 800-160 mg Tab 1 tablet, Oral, Three times weekly    tacrolimus (PROGRAF) 1 MG Cap SMARTSI Capsule(s) By Mouth Twice Daily        Assessment and Plan:      Renal Transplant Recipient 2021  - creatinine 1.6 is around patients baseline  - taking tacrolimus  - valganciclovir finished 2022  - taking Bactrim -160 mg will finish 2022  - taking mycophenolate 360 mg BID  - taking ketoconazole 100 mg daily  - OTC vitmain D3  - lokelma  - Follows up with Nephrologist Dr. Mansoor Pan Jr. And Dr. Mcelroy  - will order dexa scan     HTN  - /82 on 11/3/2022  - continue  amlodipine 5 mg and labetalol 100 mg BID.     HLD  - not longer taking Lipitor 40 mg. Taken off by nephrologist. Dr. Mcelroy     DM II  - A1c 8.4 11/3/2022  - Lantus 25 Units at bedtime. Currently taking 32 units of insulin now after checking morning glucose and it was elevated at   - glimepiride 4 mg     CHF recovered EF of  53%  - Echocardiogram 2018 revealed EF 53% with mild concentric LVH. Trace Aortic regurgitaion and mild mitral regurgitation  -  echocardiogram scheduled for 2022     Health Maintenance  -  pneumovax 13 valent 2022  - next pap smear 2023  - next mammogram 2023.  - DEXA scan ordered today due to history of renal transplant, vitamin D deficiency, and immunosuppression therapy causing her to be high risk for osteoporosis    Health Maintenance   Topic Date Due    TETANUS VACCINE  2022    Mammogram  2023    Hepatitis C Screening  Completed    Lipid Panel  Completed                  Bakari Soliz MD      14-Sep-2023 13:14

## 2023-09-14 NOTE — ED PROVIDER NOTE - NS ED ATTENDING STATEMENT MOD
I have seen and examined this patient and fully participated in the care of this patient as the teaching attending.  The service was shared with the ANGELINA.  I reviewed and verified the documentation and independently performed the documented:

## 2023-09-14 NOTE — ED ADULT NURSE NOTE - OBJECTIVE STATEMENT
pt 34 yo female presents to [place] with c/o neck pain onset Sept 1st pt denies any injury states pain radiating to leg and pt states some intermittent swelling to left wrist area and right lower arm pt skin warm dry denies any hx or meds is a smoker 1PPD pt pending eval by md in [place]

## 2023-09-14 NOTE — ED PROVIDER NOTE - PROGRESS NOTE DETAILS
Kofi Chapman, DO: Feeling improved after treatments. Discussed pending results with pt. No further concerns requiring acute interventions at this time. Remains well appearing. Agreeable to DC home. Will follow up outpt. Strict return precautions provided.

## 2023-09-15 LAB — HETEROPH AB TITR SER AGGL: NEGATIVE — SIGNIFICANT CHANGE UP

## 2024-01-25 NOTE — ED ADULT TRIAGE NOTE - ARRIVAL FROM
Preventive Care Visit  [facility]  Luci Leavitt MD, Student in organized health care education/training program  Jan 25, 2024    Assessment & Plan   2 year old 10 month old, here for preventive care.    1. Encounter for routine child health examination w/o abnormal findings  Healthy 1yo female. No concerns from parent. Meeting milestones. Growth normal. Not seeing dentist yet, recommended.   - SCREENING, VISUAL ACUITY, QUANTITATIVE, BILAT  - sodium fluoride (VANISH) 5% white varnish 1 packet  - CT APPLICATION TOPICAL FLUORIDE VARNISH BY [facility]  - Lead Capillary    Growth      Normal height and weight    Immunizations   Appropriate vaccinations were ordered.    Anticipatory Guidance    Reviewed age appropriate anticipatory guidance.   Reviewed Anticipatory Guidance in patient instructions    Referrals/Ongoing Specialty Care  None  Verbal Dental Referral: Verbal dental referral was given  Dental Fluoride Varnish: Yes, fluoride varnish application risks and benefits were discussed, and verbal consent was received.      No follow-ups on file.    Subjective   Kristina is presenting for the following:  Well Child (2 yrs old poli)        1/25/2024    12:41 PM   Additional Questions   Accompanied by mom and brothers   Questions for today's visit No   Surgery, major illness, or injury since last physical No         1/25/2024   Social   Lives with Parent(s)    Grandparent(s)    Sibling(s)   Who takes care of your child? Parent(s)   Recent potential stressors None   History of trauma No   Family Hx mental health challenges No   Lack of transportation has limited access to appts/meds No   Do you have housing?  Yes   Are you worried about losing your housing? No         1/25/2024    12:42 PM   Health Risks/Safety   What type of car seat does your child use? Car seat with harness   Is your child's car seat forward or rear facing? Forward facing   Where does your child sit in the car?  Back seat   Do you  use space heaters, wood stove, or a fireplace in your home? No   Are poisons/cleaning supplies and medications kept out of reach? Yes   Do you have a swimming pool? No   Helmet use? Yes            1/25/2024    12:42 PM   TB Screening: Consider immunosuppression as a risk factor for TB   Recent TB infection or positive TB test in family/close contacts No   Recent travel outside [place] (child/family/close contacts) No   Recent residence in high-risk group setting (correctional facility/health care facility/homeless shelter/refugee camp) No          1/25/2024    12:42 PM   Dental Screening   Has your child seen a dentist? (!) NO   Has your child had cavities in the last 2 years? Unknown   Have parents/caregivers/siblings had cavities in the last 2 years? No         1/25/2024   Diet   Do you have questions about feeding your child? No   What does your child regularly drink? Cow's Milk   What type of milk?  2%    1%   How often does your family eat meals together? (!) SOME DAYS   How many snacks does your child eat per day 3   Are there types of foods your child won't eat? No   In past 12 months, concerned food might run out No   In past 12 months, food has run out/couldn't afford more No         1/25/2024    12:42 PM   Elimination   Bowel or bladder concerns? No concerns   Toilet training status: (!) NOT INTERESTED IN TOILET TRAINING         1/25/2024   Activity   Days per week of moderate/strenuous exercise 0 days   On average, how many minutes do you engage in exercise at this level? 10 min   What does your child do for exercise?  play a lot         1/25/2024    12:42 PM   Media Use   Hours per day of screen time (for entertainment) 1hour   Screen in bedroom No         1/25/2024    12:42 PM   Sleep   Do you have any concerns about your child's sleep?  No concerns, sleeps well through the night         1/25/2024    12:42 PM   School   Early childhood screen complete (!) NO   Grade in school Not yet in school          "1/25/2024    12:42 PM   Vision/Hearing   Vision or hearing concerns No concerns         1/25/2024    12:42 PM   Development/ Social-Emotional Screen   Developmental concerns No   Does your child receive any special services? No     Development    Screening tool used, reviewed with parent/guardian:   Milestones (by observation/ exam/ report) 75-90% ile   SOCIAL/EMOTIONAL:   Calms down within 10 minutes after you leave your child, like at a childcare drop off   Notices other children and joins them to play  LANGUAGE/COMMUNICATION:   Talks with you in a conversation using at least two back and forth exchanges   Asks \"who,\" \"what,\" \"where,\" or \"why\" questions, like \"Where is mommy/daddy?\"   Says what action is happening in a picture or book when asked, like \"running,\" \"eating,\" or \"playing\"   Says first name, when asked   Talks well enough for others to understand, most of the time  COGNITIVE (LEARNING, THINKING, PROBLEM-SOLVING):   Draws a [place], when you show them how   Avoids touching hot objects, like a stove, when you warn them  MOVEMENT/PHYSICAL DEVELOPMENT:   Strings items together, like large beads or macaroni   Puts on some clothes by themself, like loose pants or a jacket   Uses a fork         Objective     Exam  BP 96/60   Pulse 103   Temp 98.1  F (36.7  C) (Tympanic)   Resp 22   Ht 0.996 m (3' 3.2\")   Wt 16.2 kg (35 lb 12.8 oz)   SpO2 94%   BMI 16.38 kg/m    48 %ile (Z= -0.04) based on CDC (Girls, 2-20 Years) Stature-for-age data based on Stature recorded on 1/25/2024.  64 %ile (Z= 0.36) based on CDC (Girls, 2-20 Years) weight-for-age data using vitals from 1/25/2024.  77 %ile (Z= 0.75) based on CDC (Girls, 2-20 Years) BMI-for-age based on BMI available as of 1/25/2024.  Blood pressure %santiago are 74% systolic and 86% diastolic based on the 2017 AAP Clinical Practice Guideline. This reading is in the normal blood pressure range.    Vision Screen    Vision Screen Details  Does the patient have " corrective lenses (glasses/contacts)?: No  Vision Acuity Screen  Vision Acuity Tool: STEPH  RIGHT EYE: 10/12.5 (20/25)  LEFT EYE: 10/12.5 (20/25)  Is there a two line difference?: No  Vision Screen Results: Pass (pass)      Physical Exam  GENERAL: Alert, well appearing, no distress  SKIN: Clear. No significant rash, abnormal pigmentation or lesions  HEAD: Normocephalic.  EYES:  Symmetric light reflex and no eye movement on cover/uncover test. Normal conjunctivae.  EARS: Normal canals. Tympanic membranes are normal; gray and translucent.  NOSE: Normal without discharge.  MOUTH/THROAT: Clear. No oral lesions. Teeth without obvious abnormalities.  NECK: Supple, no masses.  No thyromegaly.  LYMPH NODES: No adenopathy  LUNGS: Clear. No rales, rhonchi, wheezing or retractions  HEART: Regular rhythm. Normal S1/S2. No murmurs. Normal pulses.  ABDOMEN: Soft, non-tender, not distended, no masses or hepatosplenomegaly. Bowel sounds normal.   GENITALIA: Normal female external genitalia. Erwin stage I,  No inguinal herniae are present.  EXTREMITIES: Full range of motion, no deformities  NEUROLOGIC: No focal findings. Cranial nerves grossly intact: DTR's normal. Normal gait, strength and tone      Signed Electronically by: Luci Leavitt MD     Home
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
## 2024-12-09 NOTE — ED ADULT TRIAGE NOTE - BSA (M2)
Notified provider, Jazmin Caldwell PA-C, of very large liquid bloody BM. Orders received. Patient refusing to drink any more of bowel prep.    1.74

## 2025-03-30 PROBLEM — R73.03 PREDIABETES: Status: ACTIVE | Noted: 2025-03-30

## 2025-03-30 PROBLEM — R20.0 NUMBNESS AND TINGLING: Status: ACTIVE | Noted: 2025-03-30

## 2025-03-31 ENCOUNTER — EMERGENCY (EMERGENCY)
Facility: HOSPITAL | Age: 35
LOS: 1 days | Discharge: HOME | End: 2025-03-31
Attending: EMERGENCY MEDICINE
Payer: COMMERCIAL

## 2025-03-31 ENCOUNTER — TRANSCRIPTION ENCOUNTER (OUTPATIENT)
Age: 35
End: 2025-03-31

## 2025-03-31 VITALS
SYSTOLIC BLOOD PRESSURE: 126 MMHG | OXYGEN SATURATION: 99 % | DIASTOLIC BLOOD PRESSURE: 66 MMHG | RESPIRATION RATE: 18 BRPM | HEART RATE: 84 BPM | TEMPERATURE: 98 F

## 2025-03-31 VITALS
DIASTOLIC BLOOD PRESSURE: 81 MMHG | OXYGEN SATURATION: 98 % | RESPIRATION RATE: 20 BRPM | HEIGHT: 60 IN | TEMPERATURE: 98 F | SYSTOLIC BLOOD PRESSURE: 126 MMHG | WEIGHT: 179.9 LBS | HEART RATE: 104 BPM

## 2025-03-31 PROBLEM — E66.9 OBESITY (BMI 30-39.9): Status: ACTIVE | Noted: 2025-03-31

## 2025-03-31 PROBLEM — Z83.2 FAMILY HISTORY OF BLOOD DYSCRASIA: Status: ACTIVE | Noted: 2025-03-31

## 2025-03-31 PROBLEM — R06.83 SNORING: Status: ACTIVE | Noted: 2025-03-31

## 2025-03-31 PROBLEM — F41.8 ANXIETY WITH DEPRESSION: Status: ACTIVE | Noted: 2025-03-31

## 2025-03-31 PROBLEM — F17.200 CURRENT SMOKER: Status: ACTIVE | Noted: 2025-03-31

## 2025-03-31 PROCEDURE — 73030 X-RAY EXAM OF SHOULDER: CPT

## 2025-03-31 PROCEDURE — 99284 EMERGENCY DEPT VISIT MOD MDM: CPT | Mod: 25

## 2025-03-31 PROCEDURE — 73020 X-RAY EXAM OF SHOULDER: CPT | Mod: 26,59,RT

## 2025-03-31 PROCEDURE — 73030 X-RAY EXAM OF SHOULDER: CPT | Mod: 26,RT

## 2025-03-31 PROCEDURE — 99284 EMERGENCY DEPT VISIT MOD MDM: CPT

## 2025-03-31 PROCEDURE — 93971 EXTREMITY STUDY: CPT | Mod: 26,RT

## 2025-03-31 PROCEDURE — 73020 X-RAY EXAM OF SHOULDER: CPT

## 2025-03-31 PROCEDURE — 82962 GLUCOSE BLOOD TEST: CPT

## 2025-03-31 PROCEDURE — 93971 EXTREMITY STUDY: CPT

## 2025-03-31 RX ORDER — ACETAMINOPHEN 500 MG/5ML
975 LIQUID (ML) ORAL ONCE
Refills: 0 | Status: COMPLETED | OUTPATIENT
Start: 2025-03-31 | End: 2025-03-31

## 2025-03-31 RX ORDER — LIDOCAINE HCL/PF 10 MG/ML
10 VIAL (ML) INJECTION ONCE
Refills: 0 | Status: ACTIVE | OUTPATIENT
Start: 2025-03-31 | End: 2025-03-31

## 2025-03-31 RX ADMIN — Medication 975 MILLIGRAM(S): at 15:47

## 2025-03-31 NOTE — ED PROVIDER NOTE - ED STEMI HIDDEN
Calling to request a medication for pt.  
Rx sent for diflucan x1. If sx do not improve she will need to be seen. Reba Mcgarry, DAYNA CNP on 3/8/2019 at 2:08 PM    
Spoke with patient's daughter and relayed message below. She verbalized understanding.  Ragini Marsh LPN...................3/8/2019  3:00 PM  
Spoke with patient's daughter and she states patient is experiencing a yeast infection. She is hoping Reba REILLY-ENRICO will prescribe diflucan without patient being seen. She states patient has had this going on for year on and off. She has NOT tried anything OTC.     Ragini Marsh LPN...................3/8/2019  1:57 PM  
hide

## 2025-03-31 NOTE — ED ADULT NURSE NOTE - OBJECTIVE STATEMENT
35 year old female coming to ED complaining of right arm pain. A&Ox4. No significant PMH. Patient ambulatory independently with steady gait. Patient states she has had right arm pain intermittently for the past couple of months. Patient states she feels tension in her neck and then feels "shocking, electrical pain" down her right arm. Patient endorsing numbness and tingling in right arm, right hand and right fingers. Patient denies any trauma or injury to the neck or right arm. Patient states that she went to her primary care doctor who referred her to a neurologist. Patient breathing spontaneously and unlabored.

## 2025-03-31 NOTE — ED PROVIDER NOTE - NSFOLLOWUPCLINICS_GEN_ALL_ED_FT
[facility] Orthopedic Surgery  Orthopedic Surgery  [address], 3rd & 4th [place] [address]  Phone: [phone]  Fax:     [facility]  Neurology  [address], [facility] [address] [place] [address]  Phone: [phone]  Fax:

## 2025-03-31 NOTE — ED PROVIDER NOTE - NSPTACCESSSVCSAPPTDETAILS_ED_ALL_ED_FT
shooting pain down R arm, and L hand tingling. within 1 week please    +orthopedics for same reason.

## 2025-03-31 NOTE — ED PROVIDER NOTE - NSFOLLOWUPINSTRUCTIONS_ED_ALL_ED_FT
You were seen in the emergency department for right arm pain    1) Follow up with a neurologist/orthopedist within 1 week.    2) Continue to take all medications as prescribed.    3) Rest and stay hydrated. Pain can be managed with Acetaminophen (aka Tylenol) and Ibuprofen (Motrin or Advil) over the counter as directed. Use warm compresses on your right shoulder to relax your muscles, and use wrist splints at night. Avoid strenuous activity or heavy lifting with your affected arm.    4) Return to the ER for any new or worsening symptoms. Some signs to come back immediately include urine/bowel incontinence (accidentally pooping or peeing on yourself), difficulty urinating, severe pain that worsens, fevers, worsening weakness that doesn't resolve, inability to walk, or any other worsening or concerning symptoms.      Please read all attached patient information.

## 2025-03-31 NOTE — ED PROVIDER NOTE - ATTENDING CONTRIBUTION TO CARE
Attending MD Joanna Dennis:  I personally have seen and examined this patient.  Resident note reviewed and agree on plan of care and except where noted.  See HPI, PE, and MDM for details.

## 2025-03-31 NOTE — ED PROVIDER NOTE - RAPID ASSESSMENT
35 female no reported past medical history, smoker presents with evaluation for arm pain and numbness.  Patient reports several months of intermittent right upper extremity shooting pain from her shoulder, numbness in her hand.  Symptoms have been progressively worsening, worse with neck movement.  Patient also reports seeing her PMD this week who referred for neurology appointment.  Denies trauma to affected extremity.  Denies prior injury to affected extremity.  Denies focal weakness or discoloration but admits to mild swelling.  Reports family history of DVTs, patient unsure if provoked/unprovoked.    Brief exam: well appearing, mild RUE swelling, decreased sensation to RUE ulnar distribution of hand    IPaxton PA-C saw patient as a rapid assessment initially in triage. The rest of care to be performed by the primary ED team. Receiving team will follow up on labs, analgesia, any clinical imaging, and perform reassessment and disposition of the patient as clinically indicated. All decisions regarding the progression of care will be made at their discretion.

## 2025-03-31 NOTE — ED PROVIDER NOTE - PHYSICAL EXAMINATION
GENERAL: NAD, sitting in bed comfortably  HEAD:  Atraumatic, Normocephalic  EYES: EOMI, conjunctiva and sclera clear  ENT: Moist mucous membranes  CHEST/LUNG: Unlabored respirations.   HEART: Regular rate and rhythm.  EXTREMITIES:  No cyanosis or edema. Brisk capillary refill, equal radial pulses. No erythema or swelling.  NERVOUS SYSTEM:  No focal deficits. Strength 5/5 in UE, able to range well at shoulders/elbows/wrists. Sensation intact in UE/ GENERAL: NAD, sitting in bed comfortably  HEAD:  Atraumatic, Normocephalic  EYES: EOMI, conjunctiva and sclera clear  ENT: Moist mucous membranes  CHEST/LUNG: Unlabored respirations.   HEART: Regular rate and rhythm.  EXTREMITIES:  No cyanosis or edema. Brisk capillary refill, equal radial pulses. No erythema or swelling.  NERVOUS SYSTEM:  No focal deficits. Strength 5/5 in UE, able to range well at shoulders/elbows/wrists. Sensation intact in UE/  Attending Joanna Dennis: Gen: NAD, heent: atrauamtic, eomi, perrla, mmm, op pink, uvula midline, neck; nttp, no midline ttp, , cv: rrr, no murmurs, lungs: ctab, abd: soft, nontender, nondistended, no peritoneal signs, ext: wwp,no muscle wasting, distal pulses intact, able to range shoulder, , skin: raised lesion to right axilla no active drainage, neuro: awake and alert, following commands, speech clear, sensation and strength intact, no focal deficits

## 2025-03-31 NOTE — ED PROVIDER NOTE - PATIENT PORTAL LINK FT
You can access the FollowMyHealth Patient Portal offered by [facility] by registering at the following website: [email or web address]. By joining [employer]’s FollowMyHealth portal, you will also be able to view your health information using other applications (apps) compatible with our system.

## 2025-03-31 NOTE — ED PROVIDER NOTE - OBJECTIVE STATEMENT
35y F with PMH of hydradenitis suppurativa, smoker, presenting with pain in R arm for several months. Patient reports that she has been having pain that shoots down to hand, and is associated with numbness, worst in digits 4+5. Not actively having this pain/numbness, but reports it occurs sporadically. Associated with ranging neck. 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 Denies hx cancer, ivdu, urinary hesitancy, fecal/urinary incontinence, saddle anesthesia, fevers, chills, spinal surgery, spinal procedures.

## 2025-03-31 NOTE — ED PROVIDER NOTE - CLINICAL SUMMARY MEDICAL DECISION MAKING FREE TEXT BOX
Patient presentation most consistent with C8 radiculopathy vs guyon canal syndrome. Possible muscle spasm.   Will rule out fractures/bony tumors at shoulder, and assess for DVT, though less likely. Patient presentation most consistent with C8 radiculopathy vs guyon canal syndrome. Possible muscle spasm.   Will rule out fractures/bony tumors at shoulder, and assess for DVT, though less likely.  Attending Joanna Dennis: 35-year-old female with history of hiradinitus suppurative presenting with concern for intermittent right arm pain for the last couple of months.  Patient states she intermittently gets shocklike pain to her right upper extremity.  Symptoms are worse in the hand area as well as lower arm.  Patient denies any falls or trauma.  Patient states has been trying to see her PCP who referred her to her neurologist.  Patient just finally got a referral to a neurologist and was able to get an appointment in the end of May.  Patient denies any falls or trauma.  Upon arrival patient is awake alert following commands.  No recent neck injuries or neck trauma.  Patient does endorse having some neck pain.  On exam patient is awake and alert.  No focal weakness.  No muscle wasting.  Patient with mild tenderness to the right bicipital area as well as anterior shoulder.  Patient able to fully range her shoulder and no warmth or erythema making septic joint less likely.  Neurovascularly intact.  Sensation and strength intact.  Concern for possible carpal tunnel versus possible radiculopathy.  Patient also endorsing having a boil to her right axilla area.  Patient with a history of hiradinintis.  Discussed with patient performing incision and drainage.  Patient had recent blood work performed without any evidence of diabetes or abnormalities.  Will perform I&D and likely get a rapid referral to neurology to obtain a CT of the cervical spine to evaluate for herniation.  Patient had an ultrasound performed which was negative for DVT.

## 2025-03-31 NOTE — ED ADULT TRIAGE NOTE - CHIEF COMPLAINT QUOTE
R arm numbness and tingling x mons, saw PMD yesterday and recommended a neuro consult, unable to get an appointment. also states she has pain in the R arm.

## 2025-04-16 ENCOUNTER — APPOINTMENT (OUTPATIENT)
Age: 35
End: 2025-04-16
Payer: COMMERCIAL

## 2025-04-16 VITALS
BODY MASS INDEX: 36.12 KG/M2 | DIASTOLIC BLOOD PRESSURE: 81 MMHG | HEART RATE: 75 BPM | HEIGHT: 60 IN | SYSTOLIC BLOOD PRESSURE: 114 MMHG | WEIGHT: 184 LBS

## 2025-04-16 DIAGNOSIS — R20.0 ANESTHESIA OF SKIN: ICD-10-CM

## 2025-04-16 DIAGNOSIS — G90.519 COMPLEX REGIONAL PAIN SYNDROME I OF UNSPECIFIED UPPER LIMB: ICD-10-CM

## 2025-04-16 DIAGNOSIS — R20.2 ANESTHESIA OF SKIN: ICD-10-CM

## 2025-04-16 DIAGNOSIS — G56.01 CARPAL TUNNEL SYNDROME, RIGHT UPPER LIMB: ICD-10-CM

## 2025-04-16 PROCEDURE — 99215 OFFICE O/P EST HI 40 MIN: CPT

## 2025-04-16 PROCEDURE — 99205 OFFICE O/P NEW HI 60 MIN: CPT

## 2025-04-17 ENCOUNTER — TRANSCRIPTION ENCOUNTER (OUTPATIENT)
Age: 35
End: 2025-04-17

## 2025-05-14 ENCOUNTER — APPOINTMENT (OUTPATIENT)
Dept: MRI IMAGING | Facility: CLINIC | Age: 35
End: 2025-05-14
Payer: COMMERCIAL

## 2025-05-14 ENCOUNTER — OUTPATIENT (OUTPATIENT)
Dept: OUTPATIENT SERVICES | Facility: HOSPITAL | Age: 35
LOS: 1 days | End: 2025-05-14
Payer: COMMERCIAL

## 2025-05-14 DIAGNOSIS — R20.0 ANESTHESIA OF SKIN: ICD-10-CM

## 2025-05-14 PROCEDURE — 70553 MRI BRAIN STEM W/O & W/DYE: CPT

## 2025-05-14 PROCEDURE — 72156 MRI NECK SPINE W/O & W/DYE: CPT | Mod: 26

## 2025-05-14 PROCEDURE — 72156 MRI NECK SPINE W/O & W/DYE: CPT

## 2025-05-14 PROCEDURE — 70553 MRI BRAIN STEM W/O & W/DYE: CPT | Mod: 26

## 2025-05-14 PROCEDURE — A9585: CPT

## 2025-05-16 DIAGNOSIS — G95.9 DISEASE OF SPINAL CORD, UNSPECIFIED: ICD-10-CM

## 2025-05-22 ENCOUNTER — NON-APPOINTMENT (OUTPATIENT)
Age: 35
End: 2025-05-22

## 2025-07-09 ENCOUNTER — APPOINTMENT (OUTPATIENT)
Dept: NEUROLOGY | Facility: CLINIC | Age: 35
End: 2025-07-09
Payer: COMMERCIAL

## 2025-07-09 ENCOUNTER — APPOINTMENT (OUTPATIENT)
Dept: PAIN MANAGEMENT | Facility: CLINIC | Age: 35
End: 2025-07-09
Payer: COMMERCIAL

## 2025-07-09 VITALS
DIASTOLIC BLOOD PRESSURE: 77 MMHG | HEART RATE: 84 BPM | HEIGHT: 60 IN | SYSTOLIC BLOOD PRESSURE: 121 MMHG | BODY MASS INDEX: 34.36 KG/M2 | WEIGHT: 175 LBS

## 2025-07-09 PROCEDURE — 99204 OFFICE O/P NEW MOD 45 MIN: CPT

## 2025-07-09 PROCEDURE — 95886 MUSC TEST DONE W/N TEST COMP: CPT

## 2025-07-09 PROCEDURE — 95909 NRV CNDJ TST 5-6 STUDIES: CPT

## 2025-08-01 ENCOUNTER — APPOINTMENT (OUTPATIENT)
Dept: NEUROLOGY | Facility: CLINIC | Age: 35
End: 2025-08-01
Payer: COMMERCIAL

## 2025-08-01 VITALS
DIASTOLIC BLOOD PRESSURE: 88 MMHG | WEIGHT: 178 LBS | SYSTOLIC BLOOD PRESSURE: 134 MMHG | BODY MASS INDEX: 34.95 KG/M2 | HEART RATE: 87 BPM | HEIGHT: 60 IN

## 2025-08-01 DIAGNOSIS — R35.0 FREQUENCY OF MICTURITION: ICD-10-CM

## 2025-08-01 DIAGNOSIS — G95.9 DISEASE OF SPINAL CORD, UNSPECIFIED: ICD-10-CM

## 2025-08-01 PROCEDURE — G2211 COMPLEX E/M VISIT ADD ON: CPT | Mod: NC

## 2025-08-01 PROCEDURE — 99205 OFFICE O/P NEW HI 60 MIN: CPT

## 2025-08-01 RX ORDER — CHOLECALCIFEROL (VITAMIN D3) 1250 MCG
1.25 MG CAPSULE ORAL
Qty: 12 | Refills: 0 | Status: ACTIVE | COMMUNITY
Start: 2025-08-01 | End: 1900-01-01

## 2025-08-01 RX ORDER — GABAPENTIN 300 MG/1
300 CAPSULE ORAL
Qty: 60 | Refills: 11 | Status: ACTIVE | COMMUNITY
Start: 2025-08-01 | End: 1900-01-01

## 2025-08-02 ENCOUNTER — LABORATORY RESULT (OUTPATIENT)
Age: 35
End: 2025-08-02

## 2025-08-04 LAB
ACE BLD-CCNC: 87 U/L
APPEARANCE: CLEAR
BILIRUBIN URINE: NEGATIVE
BLOOD URINE: NEGATIVE
CCP AB SER IA-ACNC: <8 U/ML
CCP AB SER QL: NEGATIVE
COLOR: YELLOW
CRP SERPL-MCNC: 7 MG/L
GLUCOSE QUALITATIVE U: NEGATIVE MG/DL
HCYS SERPL-MCNC: 8.2 UMOL/L
KETONES URINE: NEGATIVE MG/DL
LEUKOCYTE ESTERASE URINE: NEGATIVE
NITRITE URINE: NEGATIVE
PH URINE: 5.5
PROTEIN URINE: NEGATIVE MG/DL
RHEUMATOID FACT SERPL-ACNC: <10 IU/ML
SPECIFIC GRAVITY URINE: 1.01
T PALLIDUM AB SER QL IA: NEGATIVE
UROBILINOGEN URINE: 0.2 MG/DL

## 2025-08-05 LAB
ANCA AB SER-IMP: NEGATIVE
B2 GLYCOPROT1 IGG SERPL IA-ACNC: <1.4 U/ML
B2 GLYCOPROT1 IGM SERPL IA-ACNC: <1.5 U/ML
C-ANCA SER-ACNC: NEGATIVE
DSDNA AB SER-ACNC: <1 IU/ML
ENA RNP AB SER-ACNC: <0.2 AL
ENA SM AB SER-ACNC: <0.2 AL
ENA SS-A AB SER-ACNC: 0.5 AL
ENA SS-B AB SER-ACNC: <0.2 AL
IGG4 SER-MCNC: 45.8 MG/DL
IL2 SERPL-MCNC: 496.4 PG/ML
P-ANCA TITR SER IF: NEGATIVE

## 2025-08-06 LAB
COPPER SERPL-MCNC: 113 UG/DL
HTLV I+II AB SER QL: NEGATIVE
ZINC SERPL-MCNC: 49 UG/DL

## 2025-08-07 LAB
ANA TITR SER: NEGATIVE
AQP4 H2O CHANNEL AB SERPL IA-ACNC: NEGATIVE
METHYLMALONATE SERPL-SCNC: 136 NMOL/L

## 2025-08-08 LAB — MOG AB SER QL CBA IFA: NEGATIVE

## 2025-08-21 ENCOUNTER — APPOINTMENT (OUTPATIENT)
Dept: OPHTHALMOLOGY | Facility: CLINIC | Age: 35
End: 2025-08-21